# Patient Record
Sex: MALE | Race: WHITE | NOT HISPANIC OR LATINO | Employment: FULL TIME | URBAN - METROPOLITAN AREA
[De-identification: names, ages, dates, MRNs, and addresses within clinical notes are randomized per-mention and may not be internally consistent; named-entity substitution may affect disease eponyms.]

---

## 2021-08-04 NOTE — PROGRESS NOTES
Assessment & Plan   1. Essential hypertension: Currently controlled on today's blood pressure. Will check BMP given lisinopril use and hypertension. Call with results when available. Otherwise follow-up annually for refills. Patient does have home blood pressure cuff to check readings intermittently. Encourage continued dietary changes and weight loss.  - amLODIPine (NORVASC) 5 MG tablet; Take 1 tablet (5 mg) by mouth 2 times daily  Dispense: 180 tablet; Refill: 3  - lisinopril (ZESTRIL) 10 MG tablet; Take 1 tablet (10 mg) by mouth daily  Dispense: 90 tablet; Refill: 3  - Basic metabolic panel  (Ca, Cl, CO2, Creat, Gluc, K, Na, BUN); Future    2. Morbid obesity (H): Encourage dietary changes and weight loss. Can offer medication management if interested in the future or referral to bariatric clinic.      Return in about 1 year (around 8/6/2022) for BP Recheck.    Roberto Mejia MD  [facility]    This chart is completed utilizing dictation software; typos and/or incorrect word substitutions may unintentionally occur.      Edward Ellington is a 40 year old who presents for the following health issues  accompanied by his self:    History of Present Illness       Hypertension: He presents for follow up of hypertension.  He does not check blood pressure  regularly outside of the clinic. Outside blood pressures have been over 140/90. He does not follow a low salt diet.         How many servings of fruits and vegetables do you eat daily?  4 or more    On average, how many sweetened beverages do you drink each day (Examples: soda, juice, sweet tea, etc.  Do NOT count diet or artificially sweetened beverages)?   0    How many days per week do you exercise enough to make your heart beat faster? 3 or less    How many minutes a day do you exercise enough to make your heart beat faster? 9 or less    How many days per week do you miss taking your medication? 0    Patient comes in today  "for follow-up of his blood pressure. Previously followed with [facility] but has had a insurance carrier change.    Currently controlled with lisinopril 10 mg recently added in December 2020. Otherwise has been on amlodipine 5 mg twice daily. Not had side effects with either medication. No chest pains, shortness of breath, claudication symptoms.    He has no other specific concerns today.    He has recently tried to make lifestyle changes due to his weight and blood pressure.    He has no other questions or concerns today.    Review of Systems   Constitutional, msk, derm, cardiovascular, pulmonary, gi and gu systems are negative, except as otherwise noted.      Objective    /84 (BP Location: Left arm, Patient Position: Chair, Cuff Size: Adult Large)   Pulse 74   Temp 97.4  F (36.3  C) (Temporal)   Resp 20   Ht 1.797 m (5' 10.75\")   Wt (!) 177.4 kg (391 lb)   SpO2 97%   BMI 54.92 kg/m    Body mass index is 54.92 kg/m .  Physical Exam   General: Obese male. Appears well and in no acute distress.  Cardiovascular: Regular rate and rhythm, normal S1 and S2 without murmur. No extra heartsounds or friction rub. Radial pulses present and equal bilaterally.  Respiratory: Lungs clear to auscultation bilaterally. No wheezing or crackles. No prolonged expiration. Symmetrical chest rise.  Musculoskeletal: No gross extremity deformities. No peripheral edema. Normal muscle bulk.     Labs: Pending        "

## 2021-08-06 ENCOUNTER — OFFICE VISIT (OUTPATIENT)
Dept: FAMILY MEDICINE | Facility: CLINIC | Age: 41
End: 2021-08-06
Payer: COMMERCIAL

## 2021-08-06 VITALS
HEART RATE: 74 BPM | SYSTOLIC BLOOD PRESSURE: 134 MMHG | BODY MASS INDEX: 44.1 KG/M2 | RESPIRATION RATE: 20 BRPM | HEIGHT: 71 IN | WEIGHT: 315 LBS | TEMPERATURE: 97.4 F | DIASTOLIC BLOOD PRESSURE: 84 MMHG | OXYGEN SATURATION: 97 %

## 2021-08-06 DIAGNOSIS — I10 ESSENTIAL HYPERTENSION: Primary | ICD-10-CM

## 2021-08-06 DIAGNOSIS — E66.01 MORBID OBESITY (H): ICD-10-CM

## 2021-08-06 LAB
ANION GAP SERPL CALCULATED.3IONS-SCNC: 7 MMOL/L (ref 3–14)
BUN SERPL-MCNC: 10 MG/DL (ref 7–30)
CALCIUM SERPL-MCNC: 8.8 MG/DL (ref 8.5–10.1)
CHLORIDE BLD-SCNC: 108 MMOL/L (ref 94–109)
CO2 SERPL-SCNC: 25 MMOL/L (ref 20–32)
CREAT SERPL-MCNC: 0.91 MG/DL (ref 0.66–1.25)
GFR SERPL CREATININE-BSD FRML MDRD: >90 ML/MIN/1.73M2
GLUCOSE BLD-MCNC: 92 MG/DL (ref 70–99)
POTASSIUM BLD-SCNC: 3.6 MMOL/L (ref 3.4–5.3)
SODIUM SERPL-SCNC: 140 MMOL/L (ref 133–144)

## 2021-08-06 PROCEDURE — 36415 COLL VENOUS BLD VENIPUNCTURE: CPT | Performed by: FAMILY MEDICINE

## 2021-08-06 PROCEDURE — 80048 BASIC METABOLIC PNL TOTAL CA: CPT | Performed by: FAMILY MEDICINE

## 2021-08-06 PROCEDURE — 99203 OFFICE O/P NEW LOW 30 MIN: CPT | Performed by: FAMILY MEDICINE

## 2021-08-06 RX ORDER — AMLODIPINE BESYLATE 5 MG/1
5 TABLET ORAL 2 TIMES DAILY
Qty: 180 TABLET | Refills: 3 | Status: SHIPPED | OUTPATIENT
Start: 2021-08-06 | End: 2022-10-03

## 2021-08-06 RX ORDER — LORATADINE 10 MG/1
10 TABLET ORAL DAILY
COMMUNITY

## 2021-08-06 RX ORDER — AMLODIPINE BESYLATE 5 MG/1
5 TABLET ORAL 2 TIMES DAILY
COMMUNITY
Start: 2021-07-08 | End: 2021-08-06

## 2021-08-06 RX ORDER — LISINOPRIL 10 MG/1
10 TABLET ORAL DAILY
Qty: 90 TABLET | Refills: 3 | Status: SHIPPED | OUTPATIENT
Start: 2021-08-06 | End: 2022-08-17

## 2021-08-06 RX ORDER — LISINOPRIL 10 MG/1
10 TABLET ORAL DAILY
COMMUNITY
Start: 2020-12-01 | End: 2021-08-06

## 2021-08-06 ASSESSMENT — MIFFLIN-ST. JEOR: SCORE: 2691.72

## 2021-08-06 ASSESSMENT — PAIN SCALES - GENERAL: PAINLEVEL: NO PAIN (0)

## 2021-08-06 NOTE — PATIENT INSTRUCTIONS
Patient Education     Checking Your Blood Pressure  Step-by-Step    Ben last reviewed this educational content on 4/1/2020 2000-2021 [employer]. All rights reserved. This information is not intended as a substitute for professional medical care. Always follow your healthcare professional's instructions.

## 2021-08-09 NOTE — RESULT ENCOUNTER NOTE
Please inform of results if patient has not viewed in [facility].    Your kidney function lab results came back normal.    Please call the clinic with any questions you may have.     Have a great day,    Dr. Huntley

## 2021-08-22 ENCOUNTER — HEALTH MAINTENANCE LETTER (OUTPATIENT)
Age: 41
End: 2021-08-22

## 2021-10-17 ENCOUNTER — HEALTH MAINTENANCE LETTER (OUTPATIENT)
Age: 41
End: 2021-10-17

## 2022-05-19 ASSESSMENT — ENCOUNTER SYMPTOMS
COUGH: 0
HYPERTENSION: 1
ORTHOPNEA: 0
DYSPNEA ON EXERTION: 0
HYPERTENSION: 1
SLEEP DISTURBANCES DUE TO BREATHING: 0
WHEEZING: 0
SYNCOPE: 0
SPUTUM PRODUCTION: 0
LEG PAIN: 0
ORTHOPNEA: 0
SHORTNESS OF BREATH: 0
LIGHT-HEADEDNESS: 0
SYNCOPE: 0
COUGH: 0
PALPITATIONS: 0
SPUTUM PRODUCTION: 0
PALPITATIONS: 0
POSTURAL DYSPNEA: 0
HEMOPTYSIS: 0
DYSPNEA ON EXERTION: 0
SNORES LOUDLY: 1
HEMOPTYSIS: 0
HYPOTENSION: 0
POSTURAL DYSPNEA: 0
EXERCISE INTOLERANCE: 0
HYPOTENSION: 0
WHEEZING: 0
SNORES LOUDLY: 1
SLEEP DISTURBANCES DUE TO BREATHING: 0
EXERCISE INTOLERANCE: 0
LEG PAIN: 0
LIGHT-HEADEDNESS: 0
COUGH DISTURBING SLEEP: 1
SHORTNESS OF BREATH: 0
COUGH DISTURBING SLEEP: 1

## 2022-05-19 ASSESSMENT — ANXIETY QUESTIONNAIRES
GAD7 TOTAL SCORE: 0
GAD7 TOTAL SCORE: 0
2. NOT BEING ABLE TO STOP OR CONTROL WORRYING: NOT AT ALL
GAD7 TOTAL SCORE: 0
2. NOT BEING ABLE TO STOP OR CONTROL WORRYING: NOT AT ALL
7. FEELING AFRAID AS IF SOMETHING AWFUL MIGHT HAPPEN: NOT AT ALL
7. FEELING AFRAID AS IF SOMETHING AWFUL MIGHT HAPPEN: NOT AT ALL
3. WORRYING TOO MUCH ABOUT DIFFERENT THINGS: NOT AT ALL
5. BEING SO RESTLESS THAT IT IS HARD TO SIT STILL: NOT AT ALL
3. WORRYING TOO MUCH ABOUT DIFFERENT THINGS: NOT AT ALL
GAD7 TOTAL SCORE: 0
7. FEELING AFRAID AS IF SOMETHING AWFUL MIGHT HAPPEN: NOT AT ALL
GAD7 TOTAL SCORE: 0
6. BECOMING EASILY ANNOYED OR IRRITABLE: NOT AT ALL
4. TROUBLE RELAXING: NOT AT ALL
GAD7 TOTAL SCORE: 0
5. BEING SO RESTLESS THAT IT IS HARD TO SIT STILL: NOT AT ALL
4. TROUBLE RELAXING: NOT AT ALL
1. FEELING NERVOUS, ANXIOUS, OR ON EDGE: NOT AT ALL
1. FEELING NERVOUS, ANXIOUS, OR ON EDGE: NOT AT ALL
7. FEELING AFRAID AS IF SOMETHING AWFUL MIGHT HAPPEN: NOT AT ALL
6. BECOMING EASILY ANNOYED OR IRRITABLE: NOT AT ALL

## 2022-05-23 ENCOUNTER — VIRTUAL VISIT (OUTPATIENT)
Dept: INTERNAL MEDICINE | Facility: CLINIC | Age: 42
End: 2022-05-23
Payer: COMMERCIAL

## 2022-05-23 DIAGNOSIS — Z00.00 ENCOUNTER FOR PREVENTATIVE ADULT HEALTH CARE EXAMINATION: Primary | ICD-10-CM

## 2022-05-23 PROCEDURE — 99207 PR NO CHARGE LOS: CPT

## 2022-05-23 NOTE — PROGRESS NOTES
Health Maintenance:  Do you have a PCP? No  When was your last visit with your PCP?   When was your last eye exam? 1 year  Have you ever had a colonoscopy?   If yes, when?   Have you ever had any polyps removed?       As part of your visit we will set up a DEXA scan which will measure your body composition. We have a few questions that need to be answered before we can schedule this scan:   What is your approximate weight? 380   Have you ever had a DEXA scan within the past 2 years? No   Will you have any other imaging studies with contrast (x-ray, CT scan) within 7  days of this appointment? No   Have you had any spine or hip surgery? No   Do you take any vitamins that contain calcium or antacids with calcium? No    If yes, stop taking 24 hours prior to visit.     Goals for the Visit:  1. Thorough Comprehensive Preventive Exam   2.   3.   Pertinent past Medical/Family and Social HX:   Pertinent sx that desire are addressed with this visit:     Answers for HPI/ROS submitted by the patient on 5/19/2022  SHARA 7 TOTAL SCORE: 0  General Symptoms: No  Skin Symptoms: No  HENT Symptoms: No  EYE SYMPTOMS: No  HEART SYMPTOMS: Yes  LUNG SYMPTOMS: Yes  INTESTINAL SYMPTOMS: No  URINARY SYMPTOMS: No  REPRODUCTIVE SYMPTOMS: No  SKELETAL SYMPTOMS: No  BLOOD SYMPTOMS: No  NERVOUS SYSTEM SYMPTOMS: No  MENTAL HEALTH SYMPTOMS: No  Chest pain or pressure: Yes  Fast or irregular heartbeat: No  Pain in legs with walking: No  Trouble breathing while lying down: No  Fingers or toes appear blue: No  High blood pressure: Yes  Low blood pressure: No  Fainting: No  Murmurs: No  Pacemaker: No  Varicose veins: Yes  Edema or swelling: No  Wake up at night with shortness of breath: No  Light-headedness: No  Exercise intolerance: No  Cough: No  Sputum or phlegm: No  Coughing up blood: No  Difficulty breating or shortness of breath: No  Snoring: Yes  Wheezing: No  Difficulty breathing on exertion: No  Nighttime Cough: Yes  Difficulty breathing when  lying flat: No        Instructions prior to appointment:   1. Fast beginning at 10 pm for lab appointment  2. If your preventive care assessment package includes a Fitness Assessment, please bring athletic shoes. Complementary [employer] fitness attire is provided and yours to keep.  3. If eye exam, eyes may be dilated, it will last 4-6 hours, may want to bring sunglasses.   4. May bring laptop or other work materials for use during downtime.   5. You will receive an email about 3 days prior to your visit with a final itinerary, menu selections for the complementary breakfast and lunch and instructions for the visit.     Complimentary  Parking provided. Drop off car in front of [facility], take the patient elevators to the [facility]. When you enter in the lobby, identify yourself as an [facility] [atient and you will be escorted up to the clinic.   If questions arise prior to your appointment please contact the clinic at [phone].

## 2022-05-26 ENCOUNTER — APPOINTMENT (OUTPATIENT)
Dept: INTERNAL MEDICINE | Facility: CLINIC | Age: 42
End: 2022-05-26

## 2022-05-26 ENCOUNTER — OFFICE VISIT (OUTPATIENT)
Dept: OPHTHALMOLOGY | Facility: CLINIC | Age: 42
End: 2022-05-26

## 2022-05-26 ENCOUNTER — OFFICE VISIT (OUTPATIENT)
Dept: INTERNAL MEDICINE | Facility: CLINIC | Age: 42
End: 2022-05-26

## 2022-05-26 ENCOUNTER — OFFICE VISIT (OUTPATIENT)
Dept: DERMATOLOGY | Facility: CLINIC | Age: 42
End: 2022-05-26

## 2022-05-26 ENCOUNTER — OFFICE VISIT (OUTPATIENT)
Dept: AUDIOLOGY | Facility: CLINIC | Age: 42
End: 2022-05-26

## 2022-05-26 ENCOUNTER — ANCILLARY PROCEDURE (OUTPATIENT)
Dept: BONE DENSITY | Facility: CLINIC | Age: 42
End: 2022-05-26

## 2022-05-26 VITALS
SYSTOLIC BLOOD PRESSURE: 138 MMHG | TEMPERATURE: 98.4 F | HEART RATE: 82 BPM | HEIGHT: 71 IN | WEIGHT: 315 LBS | DIASTOLIC BLOOD PRESSURE: 93 MMHG | RESPIRATION RATE: 16 BRPM | BODY MASS INDEX: 44.1 KG/M2 | OXYGEN SATURATION: 97 %

## 2022-05-26 DIAGNOSIS — Z00.00 ENCOUNTER FOR PREVENTATIVE ADULT HEALTH CARE EXAMINATION: ICD-10-CM

## 2022-05-26 DIAGNOSIS — E78.5 DYSLIPIDEMIA: ICD-10-CM

## 2022-05-26 DIAGNOSIS — H35.9 RETINAL PIGMENT EPITHELIUM ABNORMALITY: ICD-10-CM

## 2022-05-26 DIAGNOSIS — H52.13 SEVERE MYOPIA OF BOTH EYES: Primary | ICD-10-CM

## 2022-05-26 DIAGNOSIS — D18.01 CHERRY ANGIOMA: ICD-10-CM

## 2022-05-26 DIAGNOSIS — H10.13 ALLERGIC CONJUNCTIVITIS OF BOTH EYES: ICD-10-CM

## 2022-05-26 DIAGNOSIS — R07.89 CHEST DISCOMFORT: ICD-10-CM

## 2022-05-26 DIAGNOSIS — Z00.00 ENCOUNTER FOR PREVENTATIVE ADULT HEALTH CARE EXAMINATION: Primary | ICD-10-CM

## 2022-05-26 DIAGNOSIS — J98.8 NARROWING OF AIRWAY: ICD-10-CM

## 2022-05-26 DIAGNOSIS — Z13.89 SCREENING FOR BLOOD OR PROTEIN IN URINE: ICD-10-CM

## 2022-05-26 DIAGNOSIS — D23.9 DERMATOFIBROMA: ICD-10-CM

## 2022-05-26 DIAGNOSIS — Z82.49 FAMILY HISTORY OF PREMATURE CAD: ICD-10-CM

## 2022-05-26 DIAGNOSIS — R73.01 IFG (IMPAIRED FASTING GLUCOSE): ICD-10-CM

## 2022-05-26 DIAGNOSIS — L81.4 LENTIGINES: ICD-10-CM

## 2022-05-26 DIAGNOSIS — L82.1 SEBORRHEIC KERATOSIS: ICD-10-CM

## 2022-05-26 DIAGNOSIS — E66.3 OVERWEIGHT: ICD-10-CM

## 2022-05-26 DIAGNOSIS — I10 HYPERTENSION, UNSPECIFIED TYPE: ICD-10-CM

## 2022-05-26 DIAGNOSIS — Z01.10 EXAMINATION OF EARS AND HEARING: Primary | ICD-10-CM

## 2022-05-26 DIAGNOSIS — R06.83 SNORING: ICD-10-CM

## 2022-05-26 DIAGNOSIS — D22.9 MULTIPLE BENIGN NEVI: Primary | ICD-10-CM

## 2022-05-26 LAB
ALP SERPL-CCNC: 63 U/L (ref 40–150)
ALT SERPL W P-5'-P-CCNC: 61 U/L (ref 0–70)
BASOPHILS # BLD AUTO: 0.1 10E3/UL (ref 0–0.2)
BASOPHILS NFR BLD AUTO: 1 %
CHOLEST SERPL-MCNC: 169 MG/DL
CREAT SERPL-MCNC: 0.77 MG/DL (ref 0.66–1.25)
CREAT UR-MCNC: 44 MG/DL
DEPRECATED CALCIDIOL+CALCIFEROL SERPL-MC: 18 UG/L (ref 20–75)
EOSINOPHIL # BLD AUTO: 0.3 10E3/UL (ref 0–0.7)
EOSINOPHIL NFR BLD AUTO: 4 %
ERYTHROCYTE [DISTWIDTH] IN BLOOD BY AUTOMATED COUNT: 13.2 % (ref 10–15)
FASTING STATUS PATIENT QL REPORTED: ABNORMAL
FASTING STATUS PATIENT QL REPORTED: ABNORMAL
GFR SERPL CREATININE-BSD FRML MDRD: >90 ML/MIN/1.73M2
GLUCOSE BLD-MCNC: 111 MG/DL (ref 70–99)
HCT VFR BLD AUTO: 51.6 % (ref 40–53)
HCV AB SERPL QL IA: NONREACTIVE
HDLC SERPL-MCNC: 45 MG/DL
HGB BLD-MCNC: 17.2 G/DL (ref 13.3–17.7)
HIV 1+2 AB+HIV1 P24 AG SERPL QL IA: NONREACTIVE
HOLD SPECIMEN: NORMAL
HOLD SPECIMEN: NORMAL
IMM GRANULOCYTES # BLD: 0 10E3/UL
IMM GRANULOCYTES NFR BLD: 0 %
LDLC SERPL CALC-MCNC: 111 MG/DL
LYMPHOCYTES # BLD AUTO: 1.4 10E3/UL (ref 0.8–5.3)
LYMPHOCYTES NFR BLD AUTO: 20 %
MCH RBC QN AUTO: 29.3 PG (ref 26.5–33)
MCHC RBC AUTO-ENTMCNC: 33.3 G/DL (ref 31.5–36.5)
MCV RBC AUTO: 88 FL (ref 78–100)
MONOCYTES # BLD AUTO: 0.5 10E3/UL (ref 0–1.3)
MONOCYTES NFR BLD AUTO: 7 %
NEUTROPHILS # BLD AUTO: 4.7 10E3/UL (ref 1.6–8.3)
NEUTROPHILS NFR BLD AUTO: 68 %
NONHDLC SERPL-MCNC: 124 MG/DL
NRBC # BLD AUTO: 0 10E3/UL
NRBC BLD AUTO-RTO: 0 /100
PLATELET # BLD AUTO: 251 10E3/UL (ref 150–450)
PROT UR-MCNC: 0.08 G/L
PROT/CREAT 24H UR: 0.18 G/G CR (ref 0–0.2)
RBC # BLD AUTO: 5.88 10E6/UL (ref 4.4–5.9)
TRIGL SERPL-MCNC: 65 MG/DL
TSH SERPL DL<=0.005 MIU/L-ACNC: 2 MU/L (ref 0.4–4)
WBC # BLD AUTO: 7 10E3/UL (ref 4–11)

## 2022-05-26 PROCEDURE — 84075 ASSAY ALKALINE PHOSPHATASE: CPT | Performed by: PATHOLOGY

## 2022-05-26 PROCEDURE — 36415 COLL VENOUS BLD VENIPUNCTURE: CPT | Performed by: PATHOLOGY

## 2022-05-26 PROCEDURE — 86803 HEPATITIS C AB TEST: CPT | Mod: 90 | Performed by: PATHOLOGY

## 2022-05-26 PROCEDURE — 82306 VITAMIN D 25 HYDROXY: CPT | Mod: 90 | Performed by: PATHOLOGY

## 2022-05-26 PROCEDURE — 87389 HIV-1 AG W/HIV-1&-2 AB AG IA: CPT | Mod: 90 | Performed by: PATHOLOGY

## 2022-05-26 PROCEDURE — 80061 LIPID PANEL: CPT | Performed by: PATHOLOGY

## 2022-05-26 PROCEDURE — 82947 ASSAY GLUCOSE BLOOD QUANT: CPT | Performed by: PATHOLOGY

## 2022-05-26 PROCEDURE — 84156 ASSAY OF PROTEIN URINE: CPT | Performed by: PATHOLOGY

## 2022-05-26 PROCEDURE — 99000 SPECIMEN HANDLING OFFICE-LAB: CPT | Performed by: PATHOLOGY

## 2022-05-26 PROCEDURE — 84460 ALANINE AMINO (ALT) (SGPT): CPT | Performed by: PATHOLOGY

## 2022-05-26 PROCEDURE — 96999 UNLISTED SPEC DERM SVC/PX: CPT | Performed by: INTERNAL MEDICINE

## 2022-05-26 PROCEDURE — 99207 PR NO CHARGE LOS: CPT

## 2022-05-26 PROCEDURE — 90714 TD VACC NO PRESV 7 YRS+ IM: CPT | Performed by: INTERNAL MEDICINE

## 2022-05-26 PROCEDURE — 99386 PREV VISIT NEW AGE 40-64: CPT | Mod: 25 | Performed by: INTERNAL MEDICINE

## 2022-05-26 PROCEDURE — 82565 ASSAY OF CREATININE: CPT | Performed by: PATHOLOGY

## 2022-05-26 PROCEDURE — 92553 AUDIOMETRY AIR & BONE: CPT | Performed by: AUDIOLOGIST

## 2022-05-26 PROCEDURE — 84443 ASSAY THYROID STIM HORMONE: CPT | Performed by: PATHOLOGY

## 2022-05-26 PROCEDURE — 93010 ELECTROCARDIOGRAM REPORT: CPT | Performed by: INTERNAL MEDICINE

## 2022-05-26 PROCEDURE — 92134 CPTRZ OPH DX IMG PST SGM RTA: CPT | Performed by: OPTOMETRIST

## 2022-05-26 PROCEDURE — 85025 COMPLETE CBC W/AUTO DIFF WBC: CPT | Performed by: PATHOLOGY

## 2022-05-26 PROCEDURE — 92004 COMPRE OPH EXAM NEW PT 1/>: CPT | Performed by: OPTOMETRIST

## 2022-05-26 PROCEDURE — 94375 RESPIRATORY FLOW VOLUME LOOP: CPT | Performed by: INTERNAL MEDICINE

## 2022-05-26 PROCEDURE — 92015 DETERMINE REFRACTIVE STATE: CPT | Performed by: OPTOMETRIST

## 2022-05-26 PROCEDURE — 99203 OFFICE O/P NEW LOW 30 MIN: CPT | Performed by: DERMATOLOGY

## 2022-05-26 PROCEDURE — 77080 DXA BONE DENSITY AXIAL: CPT | Performed by: INTERNAL MEDICINE

## 2022-05-26 ASSESSMENT — VISUAL ACUITY
METHOD: SNELLEN - LINEAR
CORRECTION_TYPE: GLASSES
OS_CC: 20/20
OD_CC: 20/20

## 2022-05-26 ASSESSMENT — REFRACTION_MANIFEST
OD_CYLINDER: +1.75
OS_SPHERE: -6.25
OD_AXIS: 167
OD_SPHERE: -6.75
OS_AXIS: 167
OS_CYLINDER: +0.50

## 2022-05-26 ASSESSMENT — REFRACTION
OD_CYLINDER: +1.75
OD_AXIS: 167
OS_AXIS: 167
OD_SPHERE: -6.25
OS_CYLINDER: +0.50
OS_SPHERE: -6.00

## 2022-05-26 ASSESSMENT — REFRACTION_WEARINGRX
OD_CYLINDER: +1.75
OD_SPHERE: -6.50
OD_AXIS: 167
OS_AXIS: 167
OS_CYLINDER: +0.75
OS_SPHERE: -5.75

## 2022-05-26 ASSESSMENT — TONOMETRY
OD_IOP_MMHG: 15
IOP_METHOD: ICARE
OS_IOP_MMHG: 15

## 2022-05-26 ASSESSMENT — PAIN SCALES - GENERAL
PAINLEVEL: NO PAIN (0)
PAINLEVEL: NO PAIN (0)

## 2022-05-26 ASSESSMENT — CONF VISUAL FIELD
METHOD: COUNTING FINGERS
OS_NORMAL: 1
OD_NORMAL: 1

## 2022-05-26 ASSESSMENT — EXTERNAL EXAM - RIGHT EYE: OD_EXAM: NORMAL

## 2022-05-26 ASSESSMENT — EXTERNAL EXAM - LEFT EYE: OS_EXAM: NORMAL

## 2022-05-26 ASSESSMENT — CUP TO DISC RATIO
OS_RATIO: 0.3
OD_RATIO: 0.3

## 2022-05-26 NOTE — LETTER
5/26/2022       RE: Chandana Sprague  [address] [place] [facility] [address]     Dear Colleague,    Thank you for referring your patient, Chandana Sprague, to the [facility] [place] at [facility]. Please see a copy of my visit note below.    [facility] Dermatology Note  Encounter Date: May 26, 2022  Office Visit     Dermatology Problem List:  1. Last FBSE 5/26/2022   ____________________________________________    Assessment & Plan:    # Benign lesions: Multiple benign nevi, solar lentigines, seborrheic keratoses, cherry angiomas, dermatofibroma.   Explained to patient benign nature of lesion. No treatment is necessary at this time unless the lesion changes or becomes symptomatic.   - ABCDs of melanoma were discussed and self skin checks were advised.  - Sun precaution was advised including the use of sun screens of SPF 30 or higher, sun protective clothing, and avoidance of tanning beds.     Procedures Performed:   None    Follow-up: 1 year(s) in-person, or earlier for new or changing lesions    Staff and Scribe:     Scribe Disclosure:  I, Judy Boykin, am serving as a scribe to document services personally performed by Joe Bauer MD based on data collection and the provider's statements to me.     Provider Disclosure:   The documentation recorded by the scribe accurately reflects the services I personally performed and the decisions made by me.    Joe Bauer MD    Department of Dermatology  [facility]: Phone: [phone], Fax:[phone]  [facility]: Phone: [phone] Fax: [phone]  ____________________________________________    CC: Skin Check (Chandana is here for first FBSE, one area of concern back of R arm.)    HPI:  Mr. Chandana Spargue is a(n) 41 year old male who presents today as a  new patient for a full skin exam.     Today, patient makes note of a spot on his right upper arm. He denies associated symptoms, but states that he dislikes the appearance of it.     The patient otherwise denies any new or concerning lesions. No bleeding, painful, pruritic, or changing lesions. They report no personal history of skin cancer. There is no family history of skin cancer. No history of immunosuppression. Rare history of indoor tanning, patient reports 1-2 instances when he was young. They do use sunscreen and protective clothing when outdoors for sun protection. Health otherwise stable. No other skin concerns.     Labs Reviewed:  N/A    Physical Exam:  Vitals: There were no vitals taken for this visit.  SKIN: Total skin excluding the undergarment areas was performed. The exam included the head/face, neck, both arms, chest, back, abdomen, both legs, digits and/or nails.   - There is a firm tan/flesh colored papule that dimples with lateral pressure on the right upper arm.   - There are dome shaped bright red papules on the trunk and extremities.   - Multiple regular brown pigmented macules and papules are identified on the trunk and extremities.   - Scattered brown macules on sun exposed areas.  - There are waxy stuck on tan to brown papules on the trunk and extremities.   - No other lesions of concern on areas examined.     Medications:  Current Outpatient Medications   Medication     amLODIPine (NORVASC) 5 MG tablet     lisinopril (ZESTRIL) 10 MG tablet     loratadine (CLARITIN) 10 MG tablet     No current facility-administered medications for this visit.      Past Medical History:   Patient Active Problem List   Diagnosis     Morbid obesity (H)     Past Medical History:   Diagnosis Date     Essential hypertension             Again, thank you for allowing me to participate in the care of your patient.      Sincerely,    Joe Bauer MD

## 2022-05-26 NOTE — LETTER
Date:May 26, 2022      Patient was self referred, no letter generated. Do not send.        [facility] Health Information

## 2022-05-26 NOTE — PROGRESS NOTES
History  HPI     Annual Eye Exam     In both eyes.  Associated symptoms include Negative for dryness, eye pain, flashes and floaters.  Treatments tried include no treatments.  Pain was noted as 0/10.              Comments     Patient presents for annual eye exam. No complaints at this time.     KOLE Son May 26, 2022 8:15 AM           Last edited by Madhav Orozco, OD on 5/26/2022  8:39 AM. (History)          Assessment/Plan  (H52.13) Severe myopia of both eyes  (primary encounter diagnosis)  Comment: High myopia with astigmatism both eyes, stable   Plan: REFRACTION         Educated patient on condition and clinical findings. Dispensed spectacle prescription for full time wear. Monitor annually.    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 No treatment indicated at this time. Monitor annually.    (H10.13) Allergic conjunctivitis of both eyes  Comment: Asymptomatic  Plan:  Recommended continued use of artificial tears as needed. Monitor annually.    Return to clinic in 1 year for comprehensive eye exam.    Complete documentation of historical and exam elements from today's encounter can  be found in the full encounter summary report (not reduplicated in this progress  note). I personally obtained the chief complaint(s) and history of present illness. I  confirmed and edited as necessary the review of systems, past medical/surgical  history, family history, social history, and examination findings as documented by  others; and I examined the patient myself. I personally reviewed the relevant tests,  images, and reports as documented above. I formulated and edited as necessary the  assessment and plan and discussed the findings and management plan with the  patient and family.    Madhav Orozco, OD, FAAO

## 2022-05-26 NOTE — NURSING NOTE
Chief Complaints and History of Present Illnesses   Patient presents with     Annual Eye Exam     Chief Complaint(s) and History of Present Illness(es)     Annual Eye Exam     Laterality: both eyes    Associated symptoms: Negative for dryness, eye pain, flashes and floaters    Treatments tried: no treatments    Pain scale: 0/10              Comments     Patient preset for annual eye exam. No complaints at this time.     KOLE Son May 26, 2022 8:15 AM

## 2022-05-26 NOTE — PROGRESS NOTES
Chandana JOSUÉ Sprague comes into clinic today at the request of Dr. GUS Rainey Ordering Provider for EKG.    This service provided today was under the supervising provider of the day Dr. GUS Rainey, who was available if needed.    Yadi Piedra, [facility]

## 2022-05-26 NOTE — NURSING NOTE
Dermatology Rooming Note    Chandana Sprague's goals for this visit include:   Chief Complaint   Patient presents with     Skin Check     Chandana is here for first FBSE, one area of concern back of R arm.     Margarita Earl, EMT

## 2022-05-26 NOTE — PROGRESS NOTES
AUDIOLOGY REPORT  [facility]    SUMMARY: Audiology visit completed. See audiogram for results.      RECOMMENDATIONS: Follow-up with Dr. Rainey. Repeat hearing evaluation as medically indicated, sooner if concerns arise.        Alonso Lebron  Audiologist  [place] License  #[id number]

## 2022-05-26 NOTE — NURSING NOTE
Chief Complaint   Patient presents with     Physical     Patient is here for annual physical     Yadi Piedra CMA 7:10 AM on 5/26/2022.

## 2022-05-26 NOTE — NURSING NOTE
AHA BP    1st   139/90  2nd  142/94  3rd   138/93    Average  140/92  Yadi Piedra CMA 9:59 AM on 5/26/2022

## 2022-05-26 NOTE — PROGRESS NOTES
[facility] Dermatology Note  Encounter Date: May 26, 2022  Office Visit     Dermatology Problem List:  1. Last FBSE 5/26/2022   ____________________________________________    Assessment & Plan:    # Benign lesions: Multiple benign nevi, solar lentigines, seborrheic keratoses, cherry angiomas, dermatofibroma.   Explained to patient benign nature of lesion. No treatment is necessary at this time unless the lesion changes or becomes symptomatic.   - ABCDs of melanoma were discussed and self skin checks were advised.  - Sun precaution was advised including the use of sun screens of SPF 30 or higher, sun protective clothing, and avoidance of tanning beds.     Procedures Performed:   None    Follow-up: 1 year(s) in-person, or earlier for new or changing lesions    Staff and Scribe:     Scribe Disclosure:  I, Judy Boykin, am serving as a scribe to document services personally performed by Joe Bauer MD based on data collection and the provider's statements to me.     Provider Disclosure:   The documentation recorded by the scribe accurately reflects the services I personally performed and the decisions made by me.    Joe Buaer MD    Department of Dermatology  [facility]: Phone: [phone], Fax:[phone]  [facility]: Phone: [phone] Fax: [phone]  ____________________________________________    CC: Skin Check (Chandana is here for first FBSE, one area of concern back of R arm.)    HPI:  Mr. Chandana Sprague is a(n) 41 year old male who presents today as a new patient for a full skin exam.     Today, patient makes note of a spot on his right upper arm. He denies associated symptoms, but states that he dislikes the appearance of it.     The patient otherwise denies any new or concerning lesions. No bleeding, painful, pruritic, or changing lesions. They report  no personal history of skin cancer. There is no family history of skin cancer. No history of immunosuppression. Rare history of indoor tanning, patient reports 1-2 instances when he was young. They do use sunscreen and protective clothing when outdoors for sun protection. Health otherwise stable. No other skin concerns.     Labs Reviewed:  N/A    Physical Exam:  Vitals: There were no vitals taken for this visit.  SKIN: Total skin excluding the undergarment areas was performed. The exam included the head/face, neck, both arms, chest, back, abdomen, both legs, digits and/or nails.   - There is a firm tan/flesh colored papule that dimples with lateral pressure on the right upper arm.   - There are dome shaped bright red papules on the trunk and extremities.   - Multiple regular brown pigmented macules and papules are identified on the trunk and extremities.   - Scattered brown macules on sun exposed areas.  - There are waxy stuck on tan to brown papules on the trunk and extremities.   - No other lesions of concern on areas examined.     Medications:  Current Outpatient Medications   Medication     amLODIPine (NORVASC) 5 MG tablet     lisinopril (ZESTRIL) 10 MG tablet     loratadine (CLARITIN) 10 MG tablet     No current facility-administered medications for this visit.      Past Medical History:   Patient Active Problem List   Diagnosis     Morbid obesity (H)     Past Medical History:   Diagnosis Date     Essential hypertension

## 2022-05-26 NOTE — PATIENT INSTRUCTIONS
Patient Education     Checking for Skin Cancer  You can find cancer early by checking your skin each month. There are 3 kinds of skin cancer. They are melanoma, basal cell carcinoma, and squamous cell carcinoma. Doing monthly skin checks is the best way to find new marks or skin changes. Follow the instructions below for checking your skin.   The ABCDEs of checking moles for melanoma   Check your moles or growths for signs of melanoma using ABCDE:   Asymmetry: the sides of the mole or growth don t match  Border: the edges are ragged, notched, or blurred  Color: the color within the mole or growth varies  Diameter: the mole or growth is larger than 6 mm (size of a pencil eraser)  Evolving: the size, shape, or color of the mole or growth is changing (evolving is not shown in the images below)    Checking for other types of skin cancer  Basal cell carcinoma or squamous cell carcinoma have symptoms such as:     A spot or mole that looks different from all other marks on your skin  Changes in how an area feels, such as itching, tenderness, or pain  Changes in the skin's surface, such as oozing, bleeding, or scaliness  A sore that does not heal  New swelling or redness beyond the border of a mole    Who s at risk?  Anyone can get skin cancer. But you are at greater risk if you have:   Fair skin, light-colored hair, or light-colored eyes  Many moles or abnormal moles on your skin  A history of sunburns from sunlight or tanning beds  A family history of skin cancer  A history of exposure to radiation or chemicals  A weakened immune system  If you have had skin cancer in the past, you are at risk for recurring skin cancer.   How to check your skin  Do your monthly skin checkups in front of a full-length mirror. Check all parts of your body, including your:   Head (ears, face, neck, and scalp)  Torso (front, back, and sides)  Arms (tops, undersides, upper, and lower armpits)  Hands (palms, backs, and fingers, including  under the nails)  Buttocks and genitals  Legs (front, back, and sides)  Feet (tops, soles, toes, including under the nails, and between toes)  If you have a lot of moles, take digital photos of them each month. Make sure to take photos both up close and from a distance. These can help you see if any moles change over time.   Most skin changes are not cancer. But if you see any changes in your skin, call your doctor right away. Only he or she can diagnose a problem. If you have skin cancer, seeing your doctor can be the first step toward getting the treatment that could save your life.   [employer] last reviewed this educational content on 4/1/2019 2000-2020 The Editor[employer]. [address] [place] [address], [place] [address]. All rights reserved. This information is not intended as a substitute for professional medical care. Always follow your healthcare professional's instructions.       When should I call my doctor?  If you are worsening or not improving, please, contact us or seek urgent care as noted below.     Who should I call with questions (adults)?  [facility] (adult and pediatric): [phone]  [facility] (adult): [phone]  For urgent needs outside of business hours call the [facility] at [phone] and ask for the dermatology resident on call to be paged  If this is a medical emergency and you are unable to reach an ER, Call [phone]    Who should I call with questions (pediatric)?  [facility]- Pediatric Dermatology  Dr. Ailin Pacheco, Dr. Chato Gregory, Dr. Marcia Chase, ARSLAN Munroe, Dr. Elvi Alfaro, Dr. Minnie Britton & Dr. Roly Sweeney  Non-urgent nurse triage line; [phone]- Marlin and Lise [facility] Care Coordinatortatiana Rothman (/Complex ) [phone]    If you need a prescription refill, please contact your pharmacy. Refills are approved or denied by our  Physicians during normal business hours, Monday through Fridays  Per office policy, refills will not be granted if you have not been seen within the past year (or sooner depending on your child's condition)    Scheduling Information:  Pediatric Appointment Scheduling and Call Center [phone]  Radiology Scheduling- [phone]  Sedation Unit Scheduling- [phone]  [place] Scheduling- General [phone]; Pediatric Dermatology [phone]  Main  Services: [phone]  Thai: [phone]  Zimbabwean: [phone]  Hmong/Mauritian/Amharic: [phone]  Preadmission Nursing Department Fax Number: [phone] (Fax all pre-operative paperwork to this number)    For urgent matters arising during evenings, weekends, or holidays that cannot wait for normal business hours please call [phone] and ask for the dermatology resident on call to be paged.

## 2022-05-26 NOTE — LETTER
5/26/2022     RE: Chandana Sprague  [address] [facility] [address]     Dear Colleague,    Thank you for referring your patient, Chandana Sprague, to the [facility] [place] at . Please see a copy of my visit note below.    History and Physical Examination     SUBJECTIVE: Chief concern: preventive health review.     Past Medical History:  1.  Hypertension.  2.  History of snoring.  3.  Allergic rhinitis; sensitivity to pollen and ragweed.  4.  Status post left fibula fracture, age 16.  5.  History of chickenpox, age 1  6.  Overweight  7.  Dyslipidemia     Adverse Drug Reactions: None.     Current Medications:  Amlodipine, 5 mg twice a day  Lisinopril, 10 mg daily  Loratadine, 10 mg daily as needed     Habits:  Tobacco: Never  Alcohol: 3-5 servings, 2 days per week  Caffeine: 3-4 servings of coffee per day  Street drugs: None     Social History:  to Belle and father of daughter Ramos, age 9, a third grader who enjoys competitive dance and golf.  Chandana is a native of [facility] [place], near [place], who moved to the [place] after graduating from the [employer].  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  His typical workday lasts at least 10 hours, with one-hour commutes in each direction, with daily visits to the reconditioning center and 2-3 retail stores.  Away from work, he enjoys family activities, golf, spectator sports, and movies.  Currently his exercise is limited to golf once per week, yardwork, and walking the [employer] lots.    Family History: Father is 80, with history of tobacco use, dyslipidemia, and myocardial infarction at age 67.  Mother is 77, with history of prediabetes.  A brother 15 years his senior suffered myocardial infarction at age 55.  A  "brother 10 years his senior is in good health.  Daughter is in good health with the exception of vision challenges.  Paternal grandmother  prematurely in a motor vehicle accident; other grandparents  at advanced ages.     Review of Systems: Progressive problem with snoring, observed by spouse; no daytime somnolence, insomnia, or morning headaches.  Historically, weight gain has been triggered by stress; at age 19, he lost 100 pounds over 4 months, then slowly gained weight until reaching a stable weight, approximately 10 years ago.  Transient cough noted when first lying down at night; he denies dyspnea, sputum production, increased lower extremity edema, symptoms of GERD, and wheezing.  Intermittent right parasternal chest discomfort, with episodes noted 1-2 times per day and lasting approximate 5 seconds; symptoms are faint, not associated with nausea, diaphoresis, dyspnea, palpitations, or lightheadedness; no correlation with exertion.  No history of colonoscopy.  Covid (Pfizer) vaccinations administered on 3/23 and 2021; Covid (Moderna) vaccination administered in 10/2021.  Most recent tetanus (Tdap) vaccination was administered in 2012.  Remainder of complete review of systems was negative.    OBJECTIVE:     Vital signs: Height 71 inches.  Weight 388 pounds.  Blood pressure 140/92 on average of 3 automated readings.  Heart rate 82.  Respiratory rate 16.  Temperature 98.4 degrees.  O2 saturation 97% on room air.  General: Alert, neatly dressed and groomed, in no acute distress.  HEENT: Atraumatic and normocephalic. Eyelids, pupils, and conjunctivae appeared normal. Lips, teeth and gums appear normal.  Oropharynx showed moist mucous membranes, without exudate or erythema.  \"Crowded\" soft palate with narrow airway.  Neck: Supple, without thyromegaly, mass, or bruit. No cervical or supraclavicular lymphadenopathy.  Large neck circumference.  Back: No spinal or costovertebral angle " tenderness.  Chest: Clear to auscultation and percussion. Normal respiratory effort.  Cardiovascular: No jugular venous distention. Regular rate and rhythm, normal S1, S2 without murmur.  Abdomen: Bowel sounds positive; soft, nontender, without rebound, guarding, hepatosplenomegaly or mass.  Extremities: No cyanosis or edema.  Genitalia: Normal male genitalia, without scrotal mass or hernia. No inguinal lymphadenopathy.  Skin: Examination was deferred; full evaluation was completed earlier in day through dermatology clinic.  Neurologic: Cranial nerves II-XII were grossly intact. Sensory and motor examinations were normal. Normal gait.  Mini-cog score with 5/5.  Psychiatric: Alert and oriented ×3. Normal affect. Judgment and insight intact.  PHQ-2 score was 0.  SHARA-7 score was 0.    Creatinine 0.77, alkaline phosphatase 63, ALT 61, cholesterol 169, HDL 45, , triglycerides 65, cholesterol/HDL 3.8, urine creatinine 44, urine protein 0.08, urine protein/creatinine 0.18, glucose 111, TSH 2.00, 25-hydroxyvitamin D 18, white blood cell count 7000, hemoglobin 17.2, platelets 251,000, HIV and hepatitis C screens nonreactive.    EKG showed sinus rhythm with marked sinus arrhythmia.  Spirometry showed an FEV1 of 4.10, with an FVC of 4.63; readings were 96% and 86% of predicted values, respectively.    Limited DEXA showed normal bone density, with most negative and valid Z-score of -0.4 at the level of the wrist.     ASSESSMENT:    1.  Impaired fasting glucose.  We reviewed the implications of this diagnosis, along with importance of weight loss, additional exercise, and modification of diet.  He'll be advised of recommended type and frequency of monitoring.    2.  Snoring.  Examination was notable for a narrow airway and large neck circumference.  He denies daytime somnolence but symptoms significant amounts of caffeine and reports a very short sleep latency.  Sleep clinic consultation was recommended to evaluate  presumed obstructive sleep apnea.  We discussed the importance of identifying and treating sleep apnea and the consequences of untreated sleep apnea.    3.  Chest discomfort.  Symptoms are not typical for coronary artery disease, but in the setting of a family history of premature coronary artery disease, impaired fasting glucose, and hypertension, he was advised to schedule stress echocardiogram at his earliest convenience.  Further recommendation will be based on this result.    4.  Hypertension.  Current blood pressure is higher than target.  I will recommend monitoring and documentation of home blood pressure readings, with further evaluation if readings exceed 130/85.  We discussed the importance of weight loss and regular aerobic exercise and he will be advised to follow a reduced-sodium diet.    5.  Overweight.  Strategies to facilitate weight loss through modification of diet and exercise regimen were reviewed.  Chandana was advised to consider consultation through a medical weight management program if progress is not noted with conservative management.    6.  Vitamin D deficiency.  I will recommend vitamin D3, 100 mcg daily for 3 months, then 50 mcg daily thereafter.     7.  Preventive care.  Colonoscopy will be due at age 43.  Tetanus (Td) vaccination was provided at the time of his appointment.  He was advised to arrange Covid vaccination booster when eligible.  In addition to vitamin D3 supplements, as noted above, I recommended daily calcium intake of 1200 mg, preferably from dietary sources.  We reviewed the debate regarding the benefit of multivitamin supplements and the importance of selecting a product that does not contain iron should he choose to proceed.    PLAN: See above.     ~SRT    Answers for HPI/ROS submitted by the patient on 5/19/2022  SHARA 7 TOTAL SCORE: 0  General Symptoms: No  Skin Symptoms: No  HENT Symptoms: No  EYE SYMPTOMS: No  HEART SYMPTOMS: Yes  LUNG SYMPTOMS: Yes  INTESTINAL  SYMPTOMS: No  URINARY SYMPTOMS: No  REPRODUCTIVE SYMPTOMS: No  SKELETAL SYMPTOMS: No  BLOOD SYMPTOMS: No  NERVOUS SYSTEM SYMPTOMS: No  MENTAL HEALTH SYMPTOMS: No  Chest pain or pressure: Yes  Fast or irregular heartbeat: No  Pain in legs with walking: No  Trouble breathing while lying down: No  Fingers or toes appear blue: No  High blood pressure: Yes  Low blood pressure: No  Fainting: No  Murmurs: No  Pacemaker: No  Varicose veins: Yes  Edema or swelling: No  Wake up at night with shortness of breath: No  Light-headedness: No  Exercise intolerance: No  Cough: No  Sputum or phlegm: No  Coughing up blood: No  Difficulty breating or shortness of breath: No  Snoring: Yes  Wheezing: No  Difficulty breathing on exertion: No  Nighttime Cough: Yes  Difficulty breathing when lying flat: No    Again, thank you for allowing me to participate in the care of your patient.      Sincerely,    Ezekiel Rainey MD

## 2022-05-27 LAB
ATRIAL RATE - MUSE: 83 BPM
DIASTOLIC BLOOD PRESSURE - MUSE: NORMAL MMHG
INTERPRETATION ECG - MUSE: NORMAL
P AXIS - MUSE: 25 DEGREES
PR INTERVAL - MUSE: 140 MS
QRS DURATION - MUSE: 84 MS
QT - MUSE: 366 MS
QTC - MUSE: 430 MS
R AXIS - MUSE: -4 DEGREES
SYSTOLIC BLOOD PRESSURE - MUSE: NORMAL MMHG
T AXIS - MUSE: 37 DEGREES
VENTRICULAR RATE- MUSE: 83 BPM

## 2022-06-03 NOTE — PROGRESS NOTES
History and Physical Examination     SUBJECTIVE: Chief concern: preventive health review.     Past Medical History:  1.  Hypertension.  2.  History of snoring.  3.  Allergic rhinitis; sensitivity to pollen and ragweed.  4.  Status post left fibula fracture, age 16.  5.  History of chickenpox, age 1  6.  Overweight  7.  Dyslipidemia     Adverse Drug Reactions: None.     Current Medications:  Amlodipine, 5 mg twice a day  Lisinopril, 10 mg daily  Loratadine, 10 mg daily as needed     Habits:  Tobacco: Never  Alcohol: 3-5 servings, 2 days per week  Caffeine: 3-4 servings of coffee per day  Street drugs: None     Social History:  to Belle and father of daughter Ramos, age 9, a third grader who enjoys competitive dance and golf.  Chandana is a native of [place], near [place], who moved to the [place] after graduating from the [employer].  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  His typical workday lasts at least 10 hours, with one-hour commutes in each direction, with daily visits to the reconditioning center and 2-3 retail stores.  Away from work, he enjoys family activities, golf, spectator sports, and movies.  Currently his exercise is limited to golf once per week, yardwork, and walking the [employer] lots.    Family History: Father is 80, with history of tobacco use, dyslipidemia, and myocardial infarction at age 67.  Mother is 77, with history of prediabetes.  A brother 15 years his senior suffered myocardial infarction at age 55.  A brother 10 years his senior is in good health.  Daughter is in good health with the exception of vision challenges.  Paternal grandmother  prematurely in a motor vehicle accident; other grandparents  at advanced ages.     Review of Systems: Progressive problem with snoring, observed by spouse; no daytime  "somnolence, insomnia, or morning headaches.  Historically, weight gain has been triggered by stress; at age 19, he lost 100 pounds over 4 months, then slowly gained weight until reaching a stable weight, approximately 10 years ago.  Transient cough noted when first lying down at night; he denies dyspnea, sputum production, increased lower extremity edema, symptoms of GERD, and wheezing.  Intermittent right parasternal chest discomfort, with episodes noted 1-2 times per day and lasting approximate 5 seconds; symptoms are faint, not associated with nausea, diaphoresis, dyspnea, palpitations, or lightheadedness; no correlation with exertion.  No history of colonoscopy.  Covid (Pfizer) vaccinations administered on 3/23 and 4/14/2021; Covid (Moderna) vaccination administered in 10/2021.  Most recent tetanus (Tdap) vaccination was administered in 12/2012.  Remainder of complete review of systems was negative.    OBJECTIVE:     Vital signs: Height 71 inches.  Weight 388 pounds.  Blood pressure 140/92 on average of 3 automated readings.  Heart rate 82.  Respiratory rate 16.  Temperature 98.4 degrees.  O2 saturation 97% on room air.  General: Alert, neatly dressed and groomed, in no acute distress.  HEENT: Atraumatic and normocephalic. Eyelids, pupils, and conjunctivae appeared normal. Lips, teeth and gums appear normal.  Oropharynx showed moist mucous membranes, without exudate or erythema.  \"Crowded\" soft palate with narrow airway.  Neck: Supple, without thyromegaly, mass, or bruit. No cervical or supraclavicular lymphadenopathy.  Large neck circumference.  Back: No spinal or costovertebral angle tenderness.  Chest: Clear to auscultation and percussion. Normal respiratory effort.  Cardiovascular: No jugular venous distention. Regular rate and rhythm, normal S1, S2 without murmur.  Abdomen: Bowel sounds positive; soft, nontender, without rebound, guarding, hepatosplenomegaly or mass.  Extremities: No cyanosis or " edema.  Genitalia: Normal male genitalia, without scrotal mass or hernia. No inguinal lymphadenopathy.  Skin: Examination was deferred; full evaluation was completed earlier in day through dermatology clinic.  Neurologic: Cranial nerves II-XII were grossly intact. Sensory and motor examinations were normal. Normal gait.  Mini-cog score with 5/5.  Psychiatric: Alert and oriented ×3. Normal affect. Judgment and insight intact.  PHQ-2 score was 0.  SHARA-7 score was 0.    Creatinine 0.77, alkaline phosphatase 63, ALT 61, cholesterol 169, HDL 45, , triglycerides 65, cholesterol/HDL 3.8, urine creatinine 44, urine protein 0.08, urine protein/creatinine 0.18, glucose 111, TSH 2.00, 25-hydroxyvitamin D 18, white blood cell count 7000, hemoglobin 17.2, platelets 251,000, HIV and hepatitis C screens nonreactive.    EKG showed sinus rhythm with marked sinus arrhythmia.  Spirometry showed an FEV1 of 4.10, with an FVC of 4.63; readings were 96% and 86% of predicted values, respectively.    Limited DEXA showed normal bone density, with most negative and valid Z-score of -0.4 at the level of the wrist.     ASSESSMENT:    1.  Impaired fasting glucose.  We reviewed the implications of this diagnosis, along with importance of weight loss, additional exercise, and modification of diet.  He'll be advised of recommended type and frequency of monitoring.    2.  Snoring.  Examination was notable for a narrow airway and large neck circumference.  He denies daytime somnolence but symptoms significant amounts of caffeine and reports a very short sleep latency.  Sleep clinic consultation was recommended to evaluate presumed obstructive sleep apnea.  We discussed the importance of identifying and treating sleep apnea and the consequences of untreated sleep apnea.    3.  Chest discomfort.  Symptoms are not typical for coronary artery disease, but in the setting of a family history of premature coronary artery disease, impaired fasting  glucose, and hypertension, he was advised to schedule stress echocardiogram at his earliest convenience.  Further recommendation will be based on this result.    4.  Hypertension.  Current blood pressure is higher than target.  I will recommend monitoring and documentation of home blood pressure readings, with further evaluation if readings exceed 130/85.  We discussed the importance of weight loss and regular aerobic exercise and he will be advised to follow a reduced-sodium diet.    5.  Overweight.  Strategies to facilitate weight loss through modification of diet and exercise regimen were reviewed.  Chandana was advised to consider consultation through a medical weight management program if progress is not noted with conservative management.    6.  Vitamin D deficiency.  I will recommend vitamin D3, 100 mcg daily for 3 months, then 50 mcg daily thereafter.     7.  Preventive care.  Colonoscopy will be due at age 43.  Tetanus (Td) vaccination was provided at the time of his appointment.  He was advised to arrange Covid vaccination booster when eligible.  In addition to vitamin D3 supplements, as noted above, I recommended daily calcium intake of 1200 mg, preferably from dietary sources.  We reviewed the debate regarding the benefit of multivitamin supplements and the importance of selecting a product that does not contain iron should he choose to proceed.    PLAN: See above.     ~SRT    Answers for HPI/ROS submitted by the patient on 5/19/2022  SHARA 7 TOTAL SCORE: 0  General Symptoms: No  Skin Symptoms: No  HENT Symptoms: No  EYE SYMPTOMS: No  HEART SYMPTOMS: Yes  LUNG SYMPTOMS: Yes  INTESTINAL SYMPTOMS: No  URINARY SYMPTOMS: No  REPRODUCTIVE SYMPTOMS: No  SKELETAL SYMPTOMS: No  BLOOD SYMPTOMS: No  NERVOUS SYSTEM SYMPTOMS: No  MENTAL HEALTH SYMPTOMS: No  Chest pain or pressure: Yes  Fast or irregular heartbeat: No  Pain in legs with walking: No  Trouble breathing while lying down: No  Fingers or toes appear blue:  No  High blood pressure: Yes  Low blood pressure: No  Fainting: No  Murmurs: No  Pacemaker: No  Varicose veins: Yes  Edema or swelling: No  Wake up at night with shortness of breath: No  Light-headedness: No  Exercise intolerance: No  Cough: No  Sputum or phlegm: No  Coughing up blood: No  Difficulty breating or shortness of breath: No  Snoring: Yes  Wheezing: No  Difficulty breathing on exertion: No  Nighttime Cough: Yes  Difficulty breathing when lying flat: No

## 2022-06-07 LAB
EXPTIME-PRE: 6.02 SEC
FEF2575-%PRED-PRE: 126 %
FEF2575-PRE: 5.15 L/SEC
FEF2575-PRED: 4.06 L/SEC
FEFMAX-%PRED-PRE: 95 %
FEFMAX-PRE: 9.84 L/SEC
FEFMAX-PRED: 10.32 L/SEC
FEV1-%PRED-PRE: 96 %
FEV1-PRE: 4.1 L
FEV1FEV6-PRE: 89 %
FEV1FEV6-PRED: 81 %
FEV1FVC-PRE: 89 %
FEV1FVC-PRED: 80 %
FIFMAX-PRE: 7.75 L/SEC
FVC-%PRED-PRE: 86 %
FVC-PRE: 4.63 L
FVC-PRED: 5.36 L

## 2022-08-16 ENCOUNTER — MYC REFILL (OUTPATIENT)
Dept: FAMILY MEDICINE | Facility: CLINIC | Age: 42
End: 2022-08-16

## 2022-08-16 DIAGNOSIS — I10 ESSENTIAL HYPERTENSION: ICD-10-CM

## 2022-08-17 RX ORDER — LISINOPRIL 10 MG/1
10 TABLET ORAL DAILY
Qty: 30 TABLET | Refills: 0 | Status: SHIPPED | OUTPATIENT
Start: 2022-08-17 | End: 2022-08-18

## 2022-08-17 NOTE — TELEPHONE ENCOUNTER
"Pending Prescriptions:                       Disp   Refills    lisinopril (ZESTRIL) 10 MG tablet          90 tab*3        Sig: Take 1 tablet (10 mg) by mouth daily    Routing refill request to provider for review/approval because:  Labs out of range:      Requested Prescriptions   Pending Prescriptions Disp Refills    lisinopril (ZESTRIL) 10 MG tablet 90 tablet 3     Sig: Take 1 tablet (10 mg) by mouth daily        ACE Inhibitors (Including Combos) Protocol Failed - 8/16/2022 10:00 AM        Failed - Blood pressure under 140/90 in past 12 months       BP Readings from Last 3 Encounters:   05/26/22 (!) 138/93   08/06/21 134/84                 Failed - Recent (12 mo) or future (30 days) visit within the authorizing provider's specialty     Patient has had an office visit with the authorizing provider or a provider within the authorizing providers department within the previous 12 mos or has a future within next 30 days. See \"Patient Info\" tab in inbasket, or \"Choose Columns\" in Meds & Orders section of the refill encounter.              Failed - Normal serum potassium on file in past 12 months     Recent Labs   Lab Test 08/06/21  1435   POTASSIUM 3.6               Passed - Medication is active on med list        Passed - Patient is age 18 or older        Passed - Normal serum creatinine on file in past 12 months     Recent Labs   Lab Test 05/26/22  0731   CR 0.77       Ok to refill medication if creatinine is low                      "

## 2022-08-18 ENCOUNTER — MYC MEDICAL ADVICE (OUTPATIENT)
Dept: FAMILY MEDICINE | Facility: CLINIC | Age: 42
End: 2022-08-18

## 2022-08-18 DIAGNOSIS — I10 ESSENTIAL HYPERTENSION: ICD-10-CM

## 2022-08-18 RX ORDER — LISINOPRIL 10 MG/1
10 TABLET ORAL DAILY
Qty: 90 TABLET | Refills: 0 | Status: SHIPPED | OUTPATIENT
Start: 2022-08-18 | End: 2022-10-07

## 2022-09-29 DIAGNOSIS — I10 ESSENTIAL HYPERTENSION: ICD-10-CM

## 2022-10-03 ENCOUNTER — HEALTH MAINTENANCE LETTER (OUTPATIENT)
Age: 42
End: 2022-10-03

## 2022-10-03 RX ORDER — AMLODIPINE BESYLATE 5 MG/1
5 TABLET ORAL 2 TIMES DAILY
Qty: 60 TABLET | Refills: 0 | Status: SHIPPED | OUTPATIENT
Start: 2022-10-03 | End: 2022-10-07

## 2022-10-03 NOTE — TELEPHONE ENCOUNTER
"Pending Prescriptions:                       Disp   Refills    amLODIPine (NORVASC) 5 MG tablet           180 ta*3        Sig: Take 1 tablet (5 mg) by mouth 2 times daily    Routing refill request to provider for review/approval because:  Labs out of range:  BP    Requested Prescriptions   Pending Prescriptions Disp Refills    amLODIPine (NORVASC) 5 MG tablet 180 tablet 3     Sig: Take 1 tablet (5 mg) by mouth 2 times daily        Calcium Channel Blockers Protocol  Failed - 9/29/2022  1:46 PM        Failed - Blood pressure under 140/90 in past 12 months       BP Readings from Last 3 Encounters:   05/26/22 (!) 138/93   08/06/21 134/84                 Passed - Recent (12 mo) or future (30 days) visit within the authorizing provider's specialty     Patient has had an office visit with the authorizing provider or a provider within the authorizing providers department within the previous 12 mos or has a future within next 30 days. See \"Patient Info\" tab in inbasket, or \"Choose Columns\" in Meds & Orders section of the refill encounter.              Passed - Medication is active on med list        Passed - Patient is age 18 or older        Passed - Normal serum creatinine on file in past 12 months     Recent Labs   Lab Test 05/26/22  0731   CR 0.77       Ok to refill medication if creatinine is low                      "

## 2022-10-07 ENCOUNTER — OFFICE VISIT (OUTPATIENT)
Dept: FAMILY MEDICINE | Facility: CLINIC | Age: 42
End: 2022-10-07
Payer: COMMERCIAL

## 2022-10-07 VITALS
OXYGEN SATURATION: 96 % | WEIGHT: 315 LBS | BODY MASS INDEX: 44.1 KG/M2 | HEIGHT: 71 IN | RESPIRATION RATE: 19 BRPM | TEMPERATURE: 98.1 F | SYSTOLIC BLOOD PRESSURE: 130 MMHG | HEART RATE: 76 BPM | DIASTOLIC BLOOD PRESSURE: 86 MMHG

## 2022-10-07 DIAGNOSIS — I10 ESSENTIAL HYPERTENSION: Primary | ICD-10-CM

## 2022-10-07 DIAGNOSIS — E66.01 MORBID OBESITY (H): ICD-10-CM

## 2022-10-07 LAB
ANION GAP SERPL CALCULATED.3IONS-SCNC: 6 MMOL/L (ref 3–14)
BUN SERPL-MCNC: 11 MG/DL (ref 7–30)
CALCIUM SERPL-MCNC: 9.2 MG/DL (ref 8.5–10.1)
CHLORIDE BLD-SCNC: 108 MMOL/L (ref 94–109)
CO2 SERPL-SCNC: 28 MMOL/L (ref 20–32)
CREAT SERPL-MCNC: 0.78 MG/DL (ref 0.66–1.25)
GFR SERPL CREATININE-BSD FRML MDRD: >90 ML/MIN/1.73M2
GLUCOSE BLD-MCNC: 111 MG/DL (ref 70–99)
POTASSIUM BLD-SCNC: 3.7 MMOL/L (ref 3.4–5.3)
SODIUM SERPL-SCNC: 142 MMOL/L (ref 133–144)

## 2022-10-07 PROCEDURE — 36415 COLL VENOUS BLD VENIPUNCTURE: CPT | Performed by: FAMILY MEDICINE

## 2022-10-07 PROCEDURE — 99213 OFFICE O/P EST LOW 20 MIN: CPT | Performed by: FAMILY MEDICINE

## 2022-10-07 PROCEDURE — 80048 BASIC METABOLIC PNL TOTAL CA: CPT | Performed by: FAMILY MEDICINE

## 2022-10-07 RX ORDER — AMLODIPINE BESYLATE 5 MG/1
5 TABLET ORAL 2 TIMES DAILY
Qty: 180 TABLET | Refills: 2 | Status: SHIPPED | OUTPATIENT
Start: 2022-10-07 | End: 2023-04-25

## 2022-10-07 RX ORDER — LISINOPRIL 10 MG/1
10 TABLET ORAL DAILY
Qty: 90 TABLET | Refills: 0 | Status: SHIPPED | OUTPATIENT
Start: 2022-10-07 | End: 2022-10-07

## 2022-10-07 RX ORDER — AMLODIPINE BESYLATE 5 MG/1
5 TABLET ORAL 2 TIMES DAILY
Qty: 180 TABLET | Refills: 0 | Status: SHIPPED | OUTPATIENT
Start: 2022-10-07 | End: 2022-10-07

## 2022-10-07 RX ORDER — LISINOPRIL 10 MG/1
10 TABLET ORAL DAILY
Qty: 90 TABLET | Refills: 2 | Status: SHIPPED | OUTPATIENT
Start: 2022-10-07 | End: 2023-12-10

## 2022-10-07 ASSESSMENT — PAIN SCALES - GENERAL: PAINLEVEL: NO PAIN (0)

## 2022-10-07 NOTE — RESULT ENCOUNTER NOTE
Please inform of results if patient has not viewed in [employer] within 3 business days.    BMP - Your blood sugar was mildly elevated at 111. Your kidney function and electrolytes were normal.    Please call the clinic with any questions you may have.     Have a great day,    Dr. Huntley

## 2022-10-07 NOTE — PROGRESS NOTES
"Assessment & Plan   1. Essential hypertension: Check labs today.  Refills given. Improved on recheck.  - Basic metabolic panel  (Ca, Cl, CO2, Creat, Gluc, K, Na, BUN); Future  - amLODIPine (NORVASC) 5 MG tablet; Take 1 tablet (5 mg) by mouth 2 times daily  Dispense: 180 tablet; Refill: 2  - lisinopril (ZESTRIL) 10 MG tablet; Take 1 tablet (10 mg) by mouth daily Needs visit for further refills.  Dispense: 90 tablet; Refill: 2    2. Morbid obesity (H): Encourage weight loss, attributing to problem 1      Return in about 1 year (around 10/7/2023) for Physical Exam.    Roberto Mejia MD  [facility]    This chart is completed utilizing dictation software; typos and/or incorrect word substitutions may unintentionally occur.     Edward Ellington is a 41 year old, presenting for the following health issues:  Hypertension      History of Present Illness       Hypertension: He presents for follow up of hypertension.  He does not check blood pressure  regularly outside of the clinic. Outside blood pressures have been over 140/90. He follows a low salt diet.       Patient is here today for blood pressure follow-up.  Last seen August 2021.  Has been tolerating amlodipine and lisinopril well.  Denies any cough or ankle swelling.  Denies any shortness of breath, chest pain, claudication symptoms.  No other new supplements in addition.    He has no other questions or concerns today.      Review of Systems   Constitutional, msk, derm, cardiovascular, pulmonary, gi and gu systems are negative, except as otherwise noted.      Objective    /86   Pulse 76   Temp 98.1  F (36.7  C) (Temporal)   Resp 19   Ht 1.803 m (5' 11\")   Wt (!) 175.5 kg (387 lb)   SpO2 96%   BMI 53.98 kg/m    Body mass index is 53.98 kg/m .  Physical Exam   General: Obese male. Appears well and in no acute distress.  Cardiovascular: Regular rate and rhythm, normal S1 and S2 without murmur. No extra heartsounds " or friction rub. Radial pulses present and equal bilaterally.  Respiratory: Lungs clear to auscultation bilaterally. No wheezing or crackles. No prolonged expiration. Symmetrical chest rise.  Musculoskeletal: No gross extremity deformities. No peripheral edema. Normal muscle bulk.    Labs: home

## 2023-04-24 DIAGNOSIS — I10 ESSENTIAL HYPERTENSION: ICD-10-CM

## 2023-04-25 RX ORDER — AMLODIPINE BESYLATE 5 MG/1
5 TABLET ORAL 2 TIMES DAILY
Qty: 180 TABLET | Refills: 2 | Status: SHIPPED | OUTPATIENT
Start: 2023-04-25 | End: 2024-04-17

## 2023-04-26 ENCOUNTER — PATIENT OUTREACH (OUTPATIENT)
Dept: CARE COORDINATION | Facility: CLINIC | Age: 43
End: 2023-04-26
Payer: COMMERCIAL

## 2023-05-10 ENCOUNTER — PATIENT OUTREACH (OUTPATIENT)
Dept: CARE COORDINATION | Facility: CLINIC | Age: 43
End: 2023-05-10
Payer: COMMERCIAL

## 2023-08-12 ENCOUNTER — HEALTH MAINTENANCE LETTER (OUTPATIENT)
Age: 43
End: 2023-08-12

## 2023-12-07 ASSESSMENT — ENCOUNTER SYMPTOMS
DYSURIA: 0
NAUSEA: 0
DIZZINESS: 0
HEMATURIA: 0
PALPITATIONS: 0
ABDOMINAL PAIN: 0
FREQUENCY: 0
JOINT SWELLING: 0
SHORTNESS OF BREATH: 0
SORE THROAT: 0
CONSTIPATION: 0
EYE PAIN: 0
MYALGIAS: 0
HEARTBURN: 0
WEAKNESS: 0
COUGH: 0
ARTHRALGIAS: 0
HEMATOCHEZIA: 0
DIARRHEA: 0
HEADACHES: 0
FEVER: 0
PARESTHESIAS: 0
NERVOUS/ANXIOUS: 0
CHILLS: 0

## 2023-12-08 ENCOUNTER — OFFICE VISIT (OUTPATIENT)
Dept: FAMILY MEDICINE | Facility: CLINIC | Age: 43
End: 2023-12-08
Attending: FAMILY MEDICINE
Payer: COMMERCIAL

## 2023-12-08 VITALS
DIASTOLIC BLOOD PRESSURE: 100 MMHG | RESPIRATION RATE: 20 BRPM | HEIGHT: 71 IN | TEMPERATURE: 97.6 F | WEIGHT: 315 LBS | SYSTOLIC BLOOD PRESSURE: 146 MMHG | OXYGEN SATURATION: 96 % | HEART RATE: 71 BPM | BODY MASS INDEX: 44.1 KG/M2

## 2023-12-08 DIAGNOSIS — D23.9 DERMATOFIBROMA: ICD-10-CM

## 2023-12-08 DIAGNOSIS — I10 PRIMARY HYPERTENSION: ICD-10-CM

## 2023-12-08 DIAGNOSIS — R79.89 ELEVATED LFTS: ICD-10-CM

## 2023-12-08 DIAGNOSIS — Z12.11 SCREEN FOR COLON CANCER: ICD-10-CM

## 2023-12-08 DIAGNOSIS — E78.5 DYSLIPIDEMIA: ICD-10-CM

## 2023-12-08 DIAGNOSIS — E66.01 MORBID OBESITY (H): ICD-10-CM

## 2023-12-08 DIAGNOSIS — Z00.00 ROUTINE GENERAL MEDICAL EXAMINATION AT A HEALTH CARE FACILITY: Primary | ICD-10-CM

## 2023-12-08 DIAGNOSIS — Z13.1 SCREENING FOR DIABETES MELLITUS: ICD-10-CM

## 2023-12-08 DIAGNOSIS — R07.89 ATYPICAL CHEST PAIN: ICD-10-CM

## 2023-12-08 LAB
ALBUMIN SERPL BCG-MCNC: 4.1 G/DL (ref 3.5–5.2)
ALP SERPL-CCNC: 63 U/L (ref 40–150)
ALT SERPL W P-5'-P-CCNC: 50 U/L (ref 0–70)
ANION GAP SERPL CALCULATED.3IONS-SCNC: 11 MMOL/L (ref 7–15)
AST SERPL W P-5'-P-CCNC: 47 U/L (ref 0–45)
BILIRUB SERPL-MCNC: 0.6 MG/DL
BUN SERPL-MCNC: 9.5 MG/DL (ref 6–20)
CALCIUM SERPL-MCNC: 9.2 MG/DL (ref 8.6–10)
CHLORIDE SERPL-SCNC: 107 MMOL/L (ref 98–107)
CHOLEST SERPL-MCNC: 200 MG/DL
CREAT SERPL-MCNC: 0.78 MG/DL (ref 0.67–1.17)
DEPRECATED HCO3 PLAS-SCNC: 25 MMOL/L (ref 22–29)
EGFRCR SERPLBLD CKD-EPI 2021: >90 ML/MIN/1.73M2
ERYTHROCYTE [DISTWIDTH] IN BLOOD BY AUTOMATED COUNT: 13.2 % (ref 10–15)
FASTING STATUS PATIENT QL REPORTED: YES
GLUCOSE SERPL-MCNC: 109 MG/DL (ref 70–99)
HBA1C MFR BLD: 5.6 % (ref 0–5.6)
HCT VFR BLD AUTO: 49.7 % (ref 40–53)
HDLC SERPL-MCNC: 42 MG/DL
HGB BLD-MCNC: 17 G/DL (ref 13.3–17.7)
LDLC SERPL CALC-MCNC: 142 MG/DL
MCH RBC QN AUTO: 29.7 PG (ref 26.5–33)
MCHC RBC AUTO-ENTMCNC: 34.2 G/DL (ref 31.5–36.5)
MCV RBC AUTO: 87 FL (ref 78–100)
NONHDLC SERPL-MCNC: 158 MG/DL
PLATELET # BLD AUTO: 220 10E3/UL (ref 150–450)
POTASSIUM SERPL-SCNC: 3.7 MMOL/L (ref 3.4–5.3)
PROT SERPL-MCNC: 7.4 G/DL (ref 6.4–8.3)
RBC # BLD AUTO: 5.73 10E6/UL (ref 4.4–5.9)
SODIUM SERPL-SCNC: 143 MMOL/L (ref 135–145)
TRIGL SERPL-MCNC: 80 MG/DL
WBC # BLD AUTO: 6.7 10E3/UL (ref 4–11)

## 2023-12-08 PROCEDURE — 93000 ELECTROCARDIOGRAM COMPLETE: CPT | Performed by: FAMILY MEDICINE

## 2023-12-08 PROCEDURE — 85027 COMPLETE CBC AUTOMATED: CPT | Performed by: FAMILY MEDICINE

## 2023-12-08 PROCEDURE — 83036 HEMOGLOBIN GLYCOSYLATED A1C: CPT | Performed by: FAMILY MEDICINE

## 2023-12-08 PROCEDURE — 99396 PREV VISIT EST AGE 40-64: CPT | Mod: 25 | Performed by: FAMILY MEDICINE

## 2023-12-08 PROCEDURE — 36415 COLL VENOUS BLD VENIPUNCTURE: CPT | Performed by: FAMILY MEDICINE

## 2023-12-08 PROCEDURE — 80061 LIPID PANEL: CPT | Performed by: FAMILY MEDICINE

## 2023-12-08 PROCEDURE — 99214 OFFICE O/P EST MOD 30 MIN: CPT | Mod: 25 | Performed by: FAMILY MEDICINE

## 2023-12-08 PROCEDURE — 80053 COMPREHEN METABOLIC PANEL: CPT | Performed by: FAMILY MEDICINE

## 2023-12-08 ASSESSMENT — ENCOUNTER SYMPTOMS
CONSTIPATION: 0
FEVER: 0
NAUSEA: 0
MYALGIAS: 0
JOINT SWELLING: 0
NERVOUS/ANXIOUS: 0
FREQUENCY: 0
SHORTNESS OF BREATH: 0
COUGH: 0
EYE PAIN: 0
PARESTHESIAS: 0
DIZZINESS: 0
PALPITATIONS: 0
HEMATOCHEZIA: 0
SORE THROAT: 0
HEARTBURN: 0
ARTHRALGIAS: 0
DYSURIA: 0
CHILLS: 0
DIARRHEA: 0
WEAKNESS: 0
HEMATURIA: 0
HEADACHES: 0
ABDOMINAL PAIN: 0

## 2023-12-08 ASSESSMENT — PAIN SCALES - GENERAL: PAINLEVEL: NO PAIN (0)

## 2023-12-08 NOTE — PATIENT INSTRUCTIONS
"Please call [phone] to schedule your imaging study.    Adjusting your diet and exercise are vital to weight loss. I would recommend going online or using many of the free apps out there to calculate your \"basal metabolic rate\" or BMR. This is the number of calories on average you burn each day. This can go up or down depending on your exercise habits. It will also go down as you lose weight and need to be adjusted during your weight loss journey. An example calculator can be found at:    https://www.calculator.net/bmr-calculator.html    Keep in mind that a pound of fat is ~3500 calories. Divided by 7 days a week is ~500 calories a day. So subtracting 500 calories from the number you calculate above will get ~1 lbs per week of weight loss. This is your target daily calorie intake.    From here I would look at the nutrition labels of the foods you are eating to creat a meal plan with appropriate portions sizes that you can stick too.    Other strategies include buying smaller plates to help reduce your portions sizes if you are an \"eat everything on your plate\" kind of eater.    I would also shop online for food and pick it up or have it delivered to the your home if able to avoid snack impulse purchases.    Additionally, when eating out at restaurant, I would recommend asking for the to go box as the same time as your food comes out. Put half of your meal immediately in the box and set it to the side to take home with you for a meal on another day. (Out of sight out of mind).    We can have you meet with a dietician if desired as well to help with this.    Finally, there are many medications that can help with weight loss. The most common pills that are used are called:  Phentermine  Topiramate  Naltrexone  Bupropion.     These medications can be used individually, or in some cases together for weight loss.    There are also some injectable medications which have been found to be very effective for weight loss; " however, they can be hard to find (on back order frequently), and costly. A list of some of these medications is included below:  Wegovy (semaglutide) - Weekly shot  Ozempic (semaglutide) - Weekly shot  Trulicity (Dulaglutide) - Weekly shot  Mounjaro (Tirzepatide) - Weekly shot  Zepbound (Tirzepatide) - Weekly shot  Saxenda (Liraglutide) - Daily shot  Victoza (Liraglutide) - Daily shot    I recommend you call your insurance and ask which of the above medications are covered by your plan, and the cost to you. From there we can discuss the specifics of each medication (side effects, dosing, etc) to determine which is the best option for you.     Hope this information is helpful.    Preventive Health Recommendations  Male Ages 40 to 49    Yearly exam:             See your health care provider every year in order to  o   Review health changes.   o   Discuss preventive care.    o   Review your medicines if your doctor has prescribed any.  You should be tested each year for STDs (sexually transmitted diseases) if you re at risk.   Have a cholesterol test every 5 years.   Have a colonoscopy (test for colon cancer) beginning at age 45.  Ask your provider about other options like a yearly FIT test or Cologuard test every 3 years (stool tests)   After age 45, have a diabetes test (fasting glucose). If you are at risk for diabetes, you should have this test every 3 years.    Talk with your health care provider about whether or not a prostate cancer screening test (PSA) is right for you.    Shots: Get a flu shot each year. Get a tetanus shot every 10 years.     Nutrition:  Eat at least 5 servings of fruits and vegetables daily.   Eat whole-grain bread, whole-wheat pasta and brown rice instead of white grains and rice.   Get adequate Calcium and Vitamin D.     Lifestyle  Exercise for at least 150 minutes a week (30 minutes a day, 5 days a week). This will help you control your weight and prevent disease.   Limit alcohol to one  drink per day.   No smoking.   Wear sunscreen to prevent skin cancer.   See your dentist every six months for an exam and cleaning.

## 2023-12-08 NOTE — PROGRESS NOTES
"SUBJECTIVE:   Chandana is a 43 year old, presenting for the following:  Physical        12/8/2023     7:19 AM   Additional Questions   Roomed by CHASE     Healthy Habits:     Getting at least 3 servings of Calcium per day:  Yes    Bi-annual eye exam:  Yes    Dental care twice a year:  Yes    Sleep apnea or symptoms of sleep apnea:  None    Diet:  Regular (no restrictions)    Frequency of exercise:  2-3 days/week    Taking medications regularly:  Yes    Medication side effects:  None    Additional concerns today:  No    Very intermittent mid sternal chest pain quick episodes for 3 days. Shortness of breath would last a minute or so afterwards. Full episode lasted last than 1 week. Occurred 1 month ago. No symptoms since. No new swelling in the legs. Did not change with palpation. Symptoms present at rest.    Would like to discuss weight loss and blood pressure.    Today's PHQ-2 Score:       12/7/2023     2:09 PM   PHQ-2 ( 1999 [employer])   Q1: Little interest or pleasure in doing things 0   Q2: Feeling down, depressed or hopeless 0   PHQ-2 Score 0   Q1: Little interest or pleasure in doing things Not at all   Q2: Feeling down, depressed or hopeless Not at all   PHQ-2 Score 0     Have you ever done Advance Care Planning? (For example, a Health Directive, POLST, or a discussion with a medical provider or your loved ones about your wishes): Yes, patient states has an Advance Care Planning document and will bring a copy to the clinic.    Social History     Tobacco Use    Smoking status: Never     Passive exposure: Never    Smokeless tobacco: Never   Substance Use Topics    Alcohol use: Yes     Comment: A few beers a week         12/7/2023     2:11 PM   Alcohol Use   Prescreen: >3 drinks/day or >7 drinks/week? No     Last PSA: No results found for: \"PSA\"    Reviewed orders with patient. Reviewed health maintenance and updated orders accordingly - Yes  Lab work is in process  BP Readings from Last 3 Encounters:   12/08/23 (!) " 146/100   10/07/22 130/86   05/26/22 (!) 138/93    Wt Readings from Last 3 Encounters:   12/08/23 (!) 177.4 kg (391 lb)   10/07/22 (!) 175.5 kg (387 lb)   05/26/22 (!) 176 kg (388 lb)         Patient Active Problem List   Diagnosis    Morbid obesity (H)    Dyslipidemia    HTN (hypertension)    Seasonal allergies     Past Surgical History:   Procedure Laterality Date    ORTHOPEDIC SURGERY  1998    Plate in left leg    XR ANKLE SURGERY KATJA LEFT         Social History     Tobacco Use    Smoking status: Never     Passive exposure: Never    Smokeless tobacco: Never   Substance Use Topics    Alcohol use: Yes     Comment: A few beers a week     Family History   Problem Relation Age of Onset    Diabetes Mother         Takes a daily pill    Myocardial Infarction Father 67    Myocardial Infarction Brother 55    Celiac Disease No family hx of     Crohn's Disease No family hx of     Ulcerative Colitis No family hx of     Colon Cancer No family hx of     Prostate Cancer No family hx of     Breast Cancer No family hx of     Melanoma No family hx of     Skin Cancer No family hx of          Current Outpatient Medications   Medication Sig Dispense Refill    amLODIPine (NORVASC) 5 MG tablet Take 1 tablet (5 mg) by mouth 2 times daily 180 tablet 2    lisinopril (ZESTRIL) 20 MG tablet Take 1 tablet (20 mg) by mouth daily 90 tablet 0    loratadine (CLARITIN) 10 MG tablet Take 10 mg by mouth daily       Allergies   Allergen Reactions    Ragweeds      Other reaction(s): Runny Nose     Reviewed and updated as needed this visit by clinical staff   Tobacco  Allergies  Meds  Problems  Med Hx  Surg Hx  Fam Hx        Reviewed and updated as needed this visit by Provider   Tobacco  Allergies  Meds  Problems  Med Hx  Surg Hx  Fam Hx       Social Hx Reviewed   Past Medical History:   Diagnosis Date    Essential hypertension       Past Surgical History:   Procedure Laterality Date    ORTHOPEDIC SURGERY  1998    Plate in left leg     "XR ANKLE SURGERY KATJA LEFT       Review of Systems   Constitutional:  Negative for chills and fever.   HENT:  Negative for congestion, ear pain, hearing loss and sore throat.    Eyes:  Negative for pain and visual disturbance.   Respiratory:  Negative for cough and shortness of breath.    Cardiovascular:  Negative for chest pain, palpitations and peripheral edema.   Gastrointestinal:  Negative for abdominal pain, constipation, diarrhea, heartburn, hematochezia and nausea.   Genitourinary:  Negative for dysuria, frequency, genital sores, hematuria, impotence, penile discharge and urgency.   Musculoskeletal:  Negative for arthralgias, joint swelling and myalgias.   Skin:  Negative for rash.   Neurological:  Negative for dizziness, weakness, headaches and paresthesias.   Psychiatric/Behavioral:  Negative for mood changes. The patient is not nervous/anxious.      OBJECTIVE:   BP (!) 146/100 (BP Location: Left arm, Patient Position: Chair, Cuff Size: Adult Large)   Pulse 71   Temp 97.6  F (36.4  C) (Temporal)   Resp 20   Ht 1.803 m (5' 11\")   Wt (!) 177.4 kg (391 lb)   SpO2 96%   BMI 54.53 kg/m      Physical Exam  GENERAL: Obese male. Healthy, alert and no distress  EYES: Eyes grossly normal to inspection, PERRL and conjunctivae and sclerae normal  HENT: ear canals and TM's normal, nose and mouth without ulcers or lesions  NECK: no adenopathy, no asymmetry, masses, or scars and thyroid normal to palpation  RESP: lungs clear to auscultation - no rales, rhonchi or wheezes  CV: regular rate and rhythm, normal S1 S2, no S3 or S4, no murmur, click or rub, no peripheral edema and peripheral pulses strong  ABDOMEN: soft, nontender, no hepatosplenomegaly, no masses and bowel sounds normal  MS: no gross musculoskeletal defects noted, no edema  SKIN: Dermatofibroma on right shoulder. No suspicious lesions or rashes  NEURO: Normal strength and tone, mentation intact and speech normal  PSYCH: mentation appears normal, affect " normal/bright    Labs: pending    EKG: Normal sinus rhythm rate 63. Normal axis, normal progression of precordial leads. Flattened T waves, but no ST segment, or T wave changes concerning for acute ischemia.    ASSESSMENT/PLAN:   1. Routine general medical examination at a health care facility  Discussed personal health and safety. Routine screenings as below. Appropriate anticipatory guidance, vaccinations, and health screening recommendations delivered according to the USPSTF and other appropriate society guidelines.  Patient understands and is agreeable with the plan ordered below.  - Colonoscopy Screening  Referral; Future  - REVIEW OF HEALTH MAINTENANCE PROTOCOL ORDERS  - PRIMARY CARE FOLLOW-UP SCHEDULING; Future  - Hemoglobin A1c; Future  - Comprehensive metabolic panel (BMP + Alb, Alk Phos, ALT, AST, Total. Bili, TP); Future  - CBC with platelets; Future  - Lipid panel reflex to direct LDL Non-fasting; Future    2. Primary hypertension  Uncontrolled. On amlodipine 5 mg BID and lisinopril 10 mg. Increase lisinopril to 20 mg and recheck in 2 weeks.  - Comprehensive metabolic panel (BMP + Alb, Alk Phos, ALT, AST, Total. Bili, TP); Future  - Comprehensive metabolic panel (BMP + Alb, Alk Phos, ALT, AST, Total. Bili, TP)    3. Atypical chest pain  Given description, unlikely cardiac, but recommend evaluation given risk factors.   - EKG 12-lead complete w/read - Clinics  - Echocardiogram Dobutamine Stress; Future    4. Dermatofibroma  Reassured patient.     5. Dyslipidemia  The 10-year ASCVD risk score (Melba DK, et al., 2019) is: 3.7%    Values used to calculate the score:      Age: 43 years      Sex: Male      Is Non- : No      Diabetic: No      Tobacco smoker: No      Systolic Blood Pressure: 146 mmHg      Is BP treated: Yes      HDL Cholesterol: 42 mg/dL      Total Cholesterol: 200 mg/dL  - Lipid panel reflex to direct LDL Non-fasting; Future  - Lipid panel reflex to direct LDL  "Non-fasting    6. Screening for diabetes mellitus  - Hemoglobin A1c; Future  - Comprehensive metabolic panel (BMP + Alb, Alk Phos, ALT, AST, Total. Bili, TP); Future  - Hemoglobin A1c  - Comprehensive metabolic panel (BMP + Alb, Alk Phos, ALT, AST, Total. Bili, TP)    7. Screen for colon cancer  - Colonoscopy Screening  Referral; Future    8. Morbid obesity (H)  Noted. Encourage diet and exercise changes for weight loss and overall health improvement. Discussed BMR calculator, reading nutrition labels, being intentional and scheduling exercise, meal prep/shopping tips, discussing biological reasons for frequent failure to maintain weight loss, and medication therapy. Patient will check with insurance about coverage for GLP-1.      COUNSELING:   Reviewed preventive health counseling, as reflected in patient instructions       Regular exercise       Healthy diet/nutrition       Vision screening       Hearing screening       Colorectal cancer screening       Prostate cancer screening    BMI:   Estimated body mass index is 54.53 kg/m  as calculated from the following:    Height as of this encounter: 1.803 m (5' 11\").    Weight as of this encounter: 177.4 kg (391 lb).   Weight management plan: Discussed healthy diet and exercise guidelines    He reports that he has never smoked. He has never been exposed to tobacco smoke. He has never used smokeless tobacco.    Roberto Mejia MD  [facility]    Disclaimer: This note consists of symbols derived from keyboarding, dictation and/or voice recognition software. As a result, there may be errors in the script that have gone undetected. Please consider this when interpreting information found in this chart.    "

## 2023-12-08 NOTE — RESULT ENCOUNTER NOTE
Please inform of results if patient has not viewed in Modbook within 3 business days.    A1c - Your 3 month blood sugar average was normal at 5.6.    CBC Results - Your cell counts were normal.    Your other labs are pending.    Please call the clinic with any questions you may have.     Have a great day,    Dr. Huntley

## 2023-12-10 RX ORDER — LISINOPRIL 20 MG/1
20 TABLET ORAL DAILY
Qty: 90 TABLET | Refills: 0 | Status: SHIPPED | OUTPATIENT
Start: 2023-12-10 | End: 2024-03-15

## 2023-12-11 NOTE — RESULT ENCOUNTER NOTE
"Please inform of results if patient has not viewed in Orgdot within 3 business days.    CMP Results - Your blood sugar was 109. Your kidney function and electrolytes were normal.    Your liver tests were mildly elevated. I'd like to recheck these in 1-2 weeks and if still elevated I would recommend additional blood tests and that we obtain an ultrasound of your liver (imaging study using high frequency sound waves).     These mild liver enzyme abnormalities are typically caused by a build up of fat in the liver. While this is not necessarily a major/urgent concern, in some patients it can cause long term damage to the liver over time and is a good reason to work on diet, exercise, and weight loss as well as avoiding alcohol.    I have sent a new prescription of a higher dose of lisinopril 20 mg to your pharmacy to take for blood pressure. We will recheck your blood tests and blood pressure in 2 weeks. Please make lab and MA visits 2 weeks from now.    Please schedule a lab only visit within 2 weeks to recheck this lab.    Lipids - Your \"bad\" cholesterol was elevated. This can be improved with diet and exercise. All animal based foods such as meat, dairy, and eggs contain cholesterol. All plant based foods such as fruits, nuts, and vegetables do not.    You do not need a medication for this at this time.      Please call the clinic with any questions you may have.     Have a great day,    Dr. Huntley    The 10-year ASCVD risk score (Melba SHELL, et al., 2019) is: 3.7%    Values used to calculate the score:      Age: 43 years      Sex: Male      Is Non- : No      Diabetic: No      Tobacco smoker: No      Systolic Blood Pressure: 146 mmHg      Is BP treated: Yes      HDL Cholesterol: 42 mg/dL      Total Cholesterol: 200 mg/dL"

## 2023-12-29 ENCOUNTER — LAB (OUTPATIENT)
Dept: LAB | Facility: CLINIC | Age: 43
End: 2023-12-29
Attending: FAMILY MEDICINE
Payer: COMMERCIAL

## 2023-12-29 DIAGNOSIS — Z00.00 ROUTINE GENERAL MEDICAL EXAMINATION AT A HEALTH CARE FACILITY: ICD-10-CM

## 2023-12-29 DIAGNOSIS — I10 PRIMARY HYPERTENSION: ICD-10-CM

## 2023-12-29 DIAGNOSIS — R79.89 ELEVATED LFTS: ICD-10-CM

## 2023-12-29 LAB
ALBUMIN SERPL BCG-MCNC: 4.3 G/DL (ref 3.5–5.2)
ALP SERPL-CCNC: 60 U/L (ref 40–150)
ALT SERPL W P-5'-P-CCNC: 51 U/L (ref 0–70)
ANION GAP SERPL CALCULATED.3IONS-SCNC: 11 MMOL/L (ref 7–15)
AST SERPL W P-5'-P-CCNC: 45 U/L (ref 0–45)
BILIRUB DIRECT SERPL-MCNC: <0.2 MG/DL (ref 0–0.3)
BILIRUB SERPL-MCNC: 0.7 MG/DL
BUN SERPL-MCNC: 9.5 MG/DL (ref 6–20)
CALCIUM SERPL-MCNC: 8.9 MG/DL (ref 8.6–10)
CHLORIDE SERPL-SCNC: 106 MMOL/L (ref 98–107)
CREAT SERPL-MCNC: 0.8 MG/DL (ref 0.67–1.17)
DEPRECATED HCO3 PLAS-SCNC: 24 MMOL/L (ref 22–29)
EGFRCR SERPLBLD CKD-EPI 2021: >90 ML/MIN/1.73M2
GLUCOSE SERPL-MCNC: 114 MG/DL (ref 70–99)
POTASSIUM SERPL-SCNC: 3.9 MMOL/L (ref 3.4–5.3)
PROT SERPL-MCNC: 7.4 G/DL (ref 6.4–8.3)
SODIUM SERPL-SCNC: 141 MMOL/L (ref 135–145)

## 2023-12-29 PROCEDURE — 82248 BILIRUBIN DIRECT: CPT

## 2023-12-29 PROCEDURE — 36415 COLL VENOUS BLD VENIPUNCTURE: CPT

## 2023-12-29 PROCEDURE — 80053 COMPREHEN METABOLIC PANEL: CPT

## 2023-12-30 NOTE — RESULT ENCOUNTER NOTE
Please inform of results if patient has not viewed in Snapflowt within 3 business days.    BMP - Your blood sugar was 114. Your kidney function and electrolytes were normal.    Your liver labs are back in the normal range. We should monitor this yearly.    Please call the clinic with any questions you may have.     Have a great day,    Dr. Huntley

## 2024-03-01 ENCOUNTER — MYC MEDICAL ADVICE (OUTPATIENT)
Dept: FAMILY MEDICINE | Facility: CLINIC | Age: 44
End: 2024-03-01
Payer: COMMERCIAL

## 2024-03-01 DIAGNOSIS — E66.01 MORBID OBESITY (H): Primary | ICD-10-CM

## 2024-03-01 NOTE — TELEPHONE ENCOUNTER
Called patient. Discussed that this likely will be denied with P.A. because it is not for the indication of diabetes, but we can try. If not covered for weigh toss would like to try toperimate.     Roberto Mejia MD

## 2024-03-12 DIAGNOSIS — I10 PRIMARY HYPERTENSION: ICD-10-CM

## 2024-03-12 DIAGNOSIS — Z00.00 ROUTINE GENERAL MEDICAL EXAMINATION AT A HEALTH CARE FACILITY: ICD-10-CM

## 2024-03-19 RX ORDER — LISINOPRIL 20 MG/1
20 TABLET ORAL DAILY
Qty: 90 TABLET | Refills: 0 | OUTPATIENT
Start: 2024-03-19

## 2024-04-16 ENCOUNTER — TELEPHONE (OUTPATIENT)
Dept: GASTROENTEROLOGY | Facility: CLINIC | Age: 44
End: 2024-04-16
Payer: COMMERCIAL

## 2024-04-16 DIAGNOSIS — Z12.11 SPECIAL SCREENING FOR MALIGNANT NEOPLASMS, COLON: Primary | ICD-10-CM

## 2024-04-16 NOTE — TELEPHONE ENCOUNTER
"Endoscopy Scheduling Screen    Have you had a positive Covid test in the last 14 days?  No    What is your communication preference for Instructions and/or Bowel Prep?   MyChart    What insurance is in the chart?  Other:  medica    Ordering/Referring Provider:     ANDREW BENÍTEZ      (If ordering provider performs procedure, schedule with ordering provider unless otherwise instructed. )    BMI: Estimated body mass index is 54.53 kg/m  as calculated from the following:    Height as of 12/8/23: 1.803 m (5' 11\").    Weight as of 12/8/23: 177.4 kg (391 lb).     Sedation Ordered  moderate sedation.   If patient BMI > 50 do not schedule in [facility].    If patient BMI > 45 do not schedule at [facility].    Are you taking methadone or Suboxone?  No    Have you had difficulties, pain, or discomfort during past endoscopy procedures?  No    Are you taking any prescription medications for pain 3 or more times per week?   NO, No RN review required.    Do you have a history of malignant hyperthermia?  No    (Females) Are you currently pregnant?   No     Have you been diagnosed or told you have pulmonary hypertension?   No    Do you have an LVAD?  No    Have you been told you have moderate to severe sleep apnea?  No    Have you been told you have COPD, asthma, or any other lung disease?  No    Do you have any heart conditions?  No     Have you ever had or are you waiting for an organ transplant?  No. Continue scheduling, no site restrictions.    Have you had a stroke or transient ischemic attack (TIA aka \"mini stroke\" in the last 6 months?   No    Have you been diagnosed with or been told you have cirrhosis of the liver?   No    Are you currently on dialysis?   No    Do you need assistance transferring?   No    BMI: Estimated body mass index is 54.53 kg/m  as calculated from the following:    Height as of 12/8/23: 1.803 m (5' 11\").    Weight as of 12/8/23: 177.4 kg (391 lb).     Is patients BMI > 40 and scheduling location [facility]?  Yes " (If MAC sedation is ordered, schedule PAC eval)    Do you take an injectable medication for weight loss or diabetes (excluding insulin)?  No    Do you take the medication Naltrexone?  No    Do you take blood thinners?  No       Prep   Are you currently on dialysis or do you have chronic kidney disease?  No    Do you have a diagnosis of diabetes?  No    Do you have a diagnosis of cystic fibrosis (CF)?  No    On a regular basis do you go 3 -5 days between bowel movements?  No    BMI > 40?  Yes (Extended Prep)    Preferred Pharmacy:    [facility] [id number] [place] [facility] - [place], [place] - [address] [address] [place] [address]  [address] [address] [facility] [address]  Phone: [phone] Fax: [phone]      Final Scheduling Details     Procedure scheduled  Colonoscopy    Surgeon:  Nav     Date of procedure:  8/2     Pre-OP / PAC:   No - Not required for this site.    Location  [facility] - Per exclusion criteria.    Sedation   Moderate Sedation - Per order.      Patient Reminders:   You will receive a call from a Nurse to review instructions and health history.  This assessment must be completed prior to your procedure.  Failure to complete the Nurse assessment may result in the procedure being cancelled.      On the day of your procedure, please designate an adult(s) who can drive you home stay with you for the next 24 hours. The medicines used in the exam will make you sleepy. You will not be able to drive.      You cannot take public transportation, ride share services, or non-medical taxi service without a responsible caregiver.  Medical transport services are allowed with the requirement that a responsible caregiver will receive you at your destination.  We require that drivers and caregivers are confirmed prior to your procedure.

## 2024-04-17 DIAGNOSIS — I10 ESSENTIAL HYPERTENSION: ICD-10-CM

## 2024-04-17 RX ORDER — AMLODIPINE BESYLATE 5 MG/1
5 TABLET ORAL 2 TIMES DAILY
Qty: 180 TABLET | Refills: 0 | Status: SHIPPED | OUTPATIENT
Start: 2024-04-17 | End: 2024-07-18

## 2024-05-03 ENCOUNTER — ALLIED HEALTH/NURSE VISIT (OUTPATIENT)
Dept: FAMILY MEDICINE | Facility: CLINIC | Age: 44
End: 2024-05-03
Payer: COMMERCIAL

## 2024-05-03 ENCOUNTER — TELEPHONE (OUTPATIENT)
Dept: FAMILY MEDICINE | Facility: CLINIC | Age: 44
End: 2024-05-03

## 2024-05-03 VITALS — DIASTOLIC BLOOD PRESSURE: 92 MMHG | HEART RATE: 74 BPM | SYSTOLIC BLOOD PRESSURE: 136 MMHG

## 2024-05-03 DIAGNOSIS — I10 ESSENTIAL HYPERTENSION: Primary | ICD-10-CM

## 2024-05-03 DIAGNOSIS — Z00.00 ROUTINE GENERAL MEDICAL EXAMINATION AT A HEALTH CARE FACILITY: ICD-10-CM

## 2024-05-03 DIAGNOSIS — I10 PRIMARY HYPERTENSION: ICD-10-CM

## 2024-05-03 PROCEDURE — 99207 PR NO CHARGE NURSE ONLY: CPT

## 2024-05-03 RX ORDER — LISINOPRIL 30 MG/1
30 TABLET ORAL DAILY
Qty: 30 TABLET | Refills: 1 | Status: SHIPPED | OUTPATIENT
Start: 2024-05-03 | End: 2024-06-05

## 2024-05-03 NOTE — NURSING NOTE
Chandana Sprague is a 43 year old patient who comes in today for a Blood Pressure check.  Initial BP:  BP (!) 136/92 (BP Location: Left arm, Patient Position: Chair, Cuff Size: Adult Large)   Pulse 74      74  Disposition: results routed to provider

## 2024-05-03 NOTE — TELEPHONE ENCOUNTER
Reviewed blood pressure from today. It is improved but still elevated.     Can increase lisinopril to 30 mg daily.  Other option would be to increase norvasc to 10 mg.      Let me know if there is a preference and will send to the pharmacy.     With either, will want a blood pressure check on float schedule and recheck of bmp in about 2-3 weeks.

## 2024-05-03 NOTE — TELEPHONE ENCOUNTER
Called patient and informed of recommendations.   He does not have a preference which medication is increased, whichever the provider feels is best.     Scheduled for BP recheck and labs on 5/24/24.    Patient would like call back with which medication provider decides to adjust.    Tanisha SOTON, RN

## 2024-05-03 NOTE — TELEPHONE ENCOUNTER
Prescription sent for lisinopril 30 mg once a day.  He can finish up what he has at home of the 20 mg tablets taking 1-1/2 daily.  Continue with the amlodipine at 5 mg twice daily.

## 2024-05-24 ENCOUNTER — ALLIED HEALTH/NURSE VISIT (OUTPATIENT)
Dept: FAMILY MEDICINE | Facility: CLINIC | Age: 44
End: 2024-05-24
Payer: COMMERCIAL

## 2024-05-24 ENCOUNTER — LAB (OUTPATIENT)
Dept: LAB | Facility: CLINIC | Age: 44
End: 2024-05-24
Payer: COMMERCIAL

## 2024-05-24 VITALS — DIASTOLIC BLOOD PRESSURE: 86 MMHG | SYSTOLIC BLOOD PRESSURE: 132 MMHG | HEART RATE: 72 BPM

## 2024-05-24 DIAGNOSIS — I10 ESSENTIAL HYPERTENSION: Primary | ICD-10-CM

## 2024-05-24 DIAGNOSIS — I10 PRIMARY HYPERTENSION: ICD-10-CM

## 2024-05-24 PROCEDURE — 99207 PR NO CHARGE NURSE ONLY: CPT

## 2024-05-24 PROCEDURE — 36415 COLL VENOUS BLD VENIPUNCTURE: CPT

## 2024-05-24 PROCEDURE — 80048 BASIC METABOLIC PNL TOTAL CA: CPT

## 2024-05-24 NOTE — PROGRESS NOTES
Chandana Sprague is a 43 year old patient who comes in today for a Blood Pressure check.  Initial BP:  /86 (BP Location: Left arm, Patient Position: Chair, Cuff Size: Adult Large)   Pulse 72        Disposition: results routed to provider

## 2024-05-25 LAB
ANION GAP SERPL CALCULATED.3IONS-SCNC: 11 MMOL/L (ref 7–15)
BUN SERPL-MCNC: 9.7 MG/DL (ref 6–20)
CALCIUM SERPL-MCNC: 8.9 MG/DL (ref 8.6–10)
CHLORIDE SERPL-SCNC: 107 MMOL/L (ref 98–107)
CREAT SERPL-MCNC: 0.74 MG/DL (ref 0.67–1.17)
DEPRECATED HCO3 PLAS-SCNC: 25 MMOL/L (ref 22–29)
EGFRCR SERPLBLD CKD-EPI 2021: >90 ML/MIN/1.73M2
GLUCOSE SERPL-MCNC: 113 MG/DL (ref 70–99)
POTASSIUM SERPL-SCNC: 3.9 MMOL/L (ref 3.4–5.3)
SODIUM SERPL-SCNC: 143 MMOL/L (ref 135–145)

## 2024-06-05 DIAGNOSIS — Z00.00 ROUTINE GENERAL MEDICAL EXAMINATION AT A HEALTH CARE FACILITY: ICD-10-CM

## 2024-06-05 DIAGNOSIS — I10 PRIMARY HYPERTENSION: ICD-10-CM

## 2024-06-05 RX ORDER — LISINOPRIL 30 MG/1
30 TABLET ORAL DAILY
Qty: 90 TABLET | Refills: 1 | Status: SHIPPED | OUTPATIENT
Start: 2024-06-05

## 2024-07-05 RX ORDER — BISACODYL 5 MG/1
TABLET, DELAYED RELEASE ORAL
Qty: 4 TABLET | Refills: 0 | Status: ON HOLD | OUTPATIENT
Start: 2024-07-05 | End: 2024-08-02

## 2024-07-05 NOTE — TELEPHONE ENCOUNTER
Extended Golytely Bowel Prep  recommended due to BMI > 40.  Instructions were sent via AddFleet. Bowel prep was sent 7/5/2024 to [facility] [id number] [place] [facility] - [place], [place] - [address] [address] [place] [address].       Flor Ayoub RN Colorectal Cancer    Division of Gastroenterology at [facility]

## 2024-07-18 DIAGNOSIS — I10 ESSENTIAL HYPERTENSION: ICD-10-CM

## 2024-07-18 RX ORDER — AMLODIPINE BESYLATE 5 MG/1
5 TABLET ORAL 2 TIMES DAILY
Qty: 180 TABLET | Refills: 0 | Status: SHIPPED | OUTPATIENT
Start: 2024-07-18

## 2024-07-24 ENCOUNTER — TELEPHONE (OUTPATIENT)
Dept: GASTROENTEROLOGY | Facility: CLINIC | Age: 44
End: 2024-07-24
Payer: COMMERCIAL

## 2024-07-24 NOTE — TELEPHONE ENCOUNTER
Pre assessment completed for upcoming procedure.   (Please see previous telephone encounter notes for complete details)    Procedure details:    Arrival time and facility location reviewed.    Pre op exam needed? No.    Designated  policy reviewed. Instructed to have someone stay 6  hours post procedure.       Medication review:    Medications reviewed. Please see supporting documentation below. Holding recommendations discussed (if applicable).       Prep for procedure:     Patient stated they have already reviewed the bowel prep instructions and writer answered all patient questions (if applicable). NPO instructions reviewed.    Patient was instructed to call if any questions or concerns arise.        Any additional information needed:  N/A      Patient  verbalized understanding and had no questions or concerns at this time.      Minnie Rea, RN  [phone] option 4

## 2024-07-24 NOTE — TELEPHONE ENCOUNTER
Pre visit planning completed.      Procedure details:    Patient scheduled for Colonoscopy on 8/2/24.     Arrival time: 0930. Procedure time 1015    Facility location: [facility]; [facility] [place] [address]. Check in location: 1st [facility].     Sedation type: Conscious sedation     Pre op exam needed? No.    Indication for procedure: screening      Chart review:     Electronic implanted devices? No    Recent diagnosis of diverticulitis within the last 6 weeks? No    Diabetic? No      Medication review:    Anticoagulants? No    NSAIDS? No    Other medication HOLDING recommendations:  N/A      Prep for procedure:     Bowel prep recommendation: Extended Golytely. Bowel prep prescription sent to [facility] [id number] [place] [facility] [address]   Due to: BMI > 40.     Prep instructions sent via [employer] [facility] 7/5/24         Minnie Rea RN  Endoscopy Procedure Pre Assessment RN  [phone] option 4

## 2024-08-02 ENCOUNTER — HOSPITAL ENCOUNTER (OUTPATIENT)
Facility: CLINIC | Age: 44
Discharge: HOME | End: 2024-08-02
Attending: INTERNAL MEDICINE | Admitting: INTERNAL MEDICINE
Payer: COMMERCIAL

## 2024-08-02 VITALS
SYSTOLIC BLOOD PRESSURE: 166 MMHG | BODY MASS INDEX: 44.1 KG/M2 | RESPIRATION RATE: 28 BRPM | WEIGHT: 315 LBS | OXYGEN SATURATION: 96 % | DIASTOLIC BLOOD PRESSURE: 109 MMHG | HEART RATE: 79 BPM | HEIGHT: 71 IN

## 2024-08-02 LAB — COLONOSCOPY: NORMAL

## 2024-08-02 PROCEDURE — 250N000011 HC RX IP 250 OP 636: Performed by: INTERNAL MEDICINE

## 2024-08-02 PROCEDURE — 88305 TISSUE EXAM BY PATHOLOGIST: CPT | Mod: TC | Performed by: INTERNAL MEDICINE

## 2024-08-02 PROCEDURE — 45380 COLONOSCOPY AND BIOPSY: CPT | Mod: XU | Performed by: INTERNAL MEDICINE

## 2024-08-02 PROCEDURE — 45385 COLONOSCOPY W/LESION REMOVAL: CPT | Performed by: INTERNAL MEDICINE

## 2024-08-02 PROCEDURE — 99153 MOD SED SAME PHYS/QHP EA: CPT | Mod: PT | Performed by: INTERNAL MEDICINE

## 2024-08-02 PROCEDURE — G0500 MOD SEDAT ENDO SERVICE >5YRS: HCPCS | Performed by: INTERNAL MEDICINE

## 2024-08-02 PROCEDURE — 88305 TISSUE EXAM BY PATHOLOGIST: CPT | Mod: 26 | Performed by: PATHOLOGY

## 2024-08-02 RX ORDER — FENTANYL CITRATE 50 UG/ML
INJECTION, SOLUTION INTRAMUSCULAR; INTRAVENOUS PRN
Status: DISCONTINUED | OUTPATIENT
Start: 2024-08-02 | End: 2024-08-02 | Stop reason: HOSPADM

## 2024-08-02 ASSESSMENT — ACTIVITIES OF DAILY LIVING (ADL): ADLS_ACUITY_SCORE: 35

## 2024-08-05 LAB
PATH REPORT.COMMENTS IMP SPEC: NORMAL
PATH REPORT.COMMENTS IMP SPEC: NORMAL
PATH REPORT.FINAL DX SPEC: NORMAL
PATH REPORT.GROSS SPEC: NORMAL
PATH REPORT.MICROSCOPIC SPEC OTHER STN: NORMAL
PATH REPORT.RELEVANT HX SPEC: NORMAL
PHOTO IMAGE: NORMAL

## 2024-08-16 ENCOUNTER — PATIENT OUTREACH (OUTPATIENT)
Dept: GASTROENTEROLOGY | Facility: CLINIC | Age: 44
End: 2024-08-16
Payer: COMMERCIAL

## 2024-10-12 DIAGNOSIS — I10 ESSENTIAL HYPERTENSION: ICD-10-CM

## 2024-10-14 RX ORDER — AMLODIPINE BESYLATE 5 MG/1
5 TABLET ORAL 2 TIMES DAILY
Qty: 180 TABLET | Refills: 0 | Status: SHIPPED | OUTPATIENT
Start: 2024-10-14

## 2024-10-14 NOTE — TELEPHONE ENCOUNTER
"Mychart sent     A 1x brock refill has been given. Patient is due for a \"MA/Nursing\" appointment for blood pressure recheck.   "

## 2024-10-14 NOTE — TELEPHONE ENCOUNTER
"A 1x brock refill has been given. Patient is due for a \"MA/Nursing\" appointment for blood pressure recheck.     Please contact patient and assist in scheduling a \"MA/Nursing\" appointment.     Inform patient future refills will be declined without completing appointment for monitoring control, or making mutually agreed upon follow-up arrangements .       Thank you,    Dr. Huntley  "

## 2024-11-08 ENCOUNTER — PATIENT OUTREACH (OUTPATIENT)
Dept: CARE COORDINATION | Facility: CLINIC | Age: 44
End: 2024-11-08
Payer: COMMERCIAL

## 2024-12-08 DIAGNOSIS — I10 PRIMARY HYPERTENSION: ICD-10-CM

## 2024-12-08 DIAGNOSIS — Z00.00 ROUTINE GENERAL MEDICAL EXAMINATION AT A HEALTH CARE FACILITY: ICD-10-CM

## 2024-12-09 RX ORDER — LISINOPRIL 30 MG/1
30 TABLET ORAL DAILY
Qty: 30 TABLET | Refills: 0 | Status: SHIPPED | OUTPATIENT
Start: 2024-12-09

## 2024-12-20 PROBLEM — D12.6 TUBULAR ADENOMA OF COLON: Status: ACTIVE | Noted: 2024-12-20

## 2024-12-23 ENCOUNTER — TELEPHONE (OUTPATIENT)
Dept: FAMILY MEDICINE | Facility: CLINIC | Age: 44
End: 2024-12-23
Payer: COMMERCIAL

## 2024-12-23 NOTE — TELEPHONE ENCOUNTER
" Lonnie Ellington,     If you have not viewed these results on [employer] within 3 days, we will use an alternative method to contact you. We will contact you via the following protocol:    - Via letter if your results are normal.  - Via phone ([phone]) if your results are abnormal.     Here are my comments about your recent results:     Lipids - Your \"bad\" cholesterol was elevated. This can be improved with diet and exercise. All animal based foods such as meat, dairy, and eggs contain cholesterol. All plant based foods such as fruits, nuts, and vegetables do not.     You do not need a medication for this at this time.     CMP Results - Your blood sugar was high, an added on follow-up test is pending. Your kidney function, electrolytes, and liver function were normal/stable for you.     Please call the clinic ([phone]), or message us on [employer] with any questions you may have.     Have a great day,     Dr. Huntley     The 10-year ASCVD risk score (Melba DK, et al., 2019) is: 4.8%    Values used to calculate the score:      Age: 44 years      Sex: Male      Is Non- : No      Diabetic: No      Tobacco smoker: No      Systolic Blood Pressure: 155 mmHg      Is BP treated: Yes      HDL Cholesterol: 43 mg/dL      Total Cholesterol: 208 mg/dL     "

## 2024-12-24 NOTE — TELEPHONE ENCOUNTER
Seen by patient Chandana Sprague on 12/21/2024  6:31 PM     Glendy Lilly RN  [facility] - Registered Nurse  Clinic Triage [place]   December 24, 2024

## 2024-12-27 ENCOUNTER — MYC MEDICAL ADVICE (OUTPATIENT)
Dept: FAMILY MEDICINE | Facility: CLINIC | Age: 44
End: 2024-12-27
Payer: COMMERCIAL

## 2024-12-27 NOTE — TELEPHONE ENCOUNTER
"Patient scheduled on MA schedule on 1/3  for \"Follow-Up\". Sent MyChart to patient to see if he is looking to see provider for follow up or if this is a blood pressure check from last office visit.  "

## 2024-12-30 NOTE — TELEPHONE ENCOUNTER
Sent mychart and updated appt notes to reflect that MA visit is correct and just needs BP check.    Chely Perkins CMA ([facility])

## 2025-07-09 DIAGNOSIS — I10 ESSENTIAL HYPERTENSION: ICD-10-CM

## 2025-07-09 RX ORDER — HYDROCHLOROTHIAZIDE 25 MG/1
25 TABLET ORAL DAILY
Qty: 90 TABLET | Refills: 0 | Status: SHIPPED | OUTPATIENT
Start: 2025-07-09

## (undated) RX ORDER — FENTANYL CITRATE 50 UG/ML
INJECTION, SOLUTION INTRAMUSCULAR; INTRAVENOUS
Status: DISPENSED
Start: 2024-08-02